# Patient Record
Sex: FEMALE | Race: OTHER | HISPANIC OR LATINO
[De-identification: names, ages, dates, MRNs, and addresses within clinical notes are randomized per-mention and may not be internally consistent; named-entity substitution may affect disease eponyms.]

---

## 2017-01-17 ENCOUNTER — APPOINTMENT (OUTPATIENT)
Dept: INTERNAL MEDICINE | Facility: CLINIC | Age: 47
End: 2017-01-17

## 2017-04-27 ENCOUNTER — APPOINTMENT (OUTPATIENT)
Dept: INTERNAL MEDICINE | Facility: CLINIC | Age: 47
End: 2017-04-27

## 2017-06-06 ENCOUNTER — OUTPATIENT (OUTPATIENT)
Dept: OUTPATIENT SERVICES | Facility: HOSPITAL | Age: 47
LOS: 1 days | End: 2017-06-06
Payer: SELF-PAY

## 2017-06-06 ENCOUNTER — APPOINTMENT (OUTPATIENT)
Dept: INTERNAL MEDICINE | Facility: CLINIC | Age: 47
End: 2017-06-06

## 2017-06-06 VITALS
WEIGHT: 128 LBS | OXYGEN SATURATION: 98 % | DIASTOLIC BLOOD PRESSURE: 79 MMHG | SYSTOLIC BLOOD PRESSURE: 126 MMHG | HEART RATE: 67 BPM | BODY MASS INDEX: 29.75 KG/M2

## 2017-06-06 DIAGNOSIS — Z86.39 PERSONAL HISTORY OF OTHER ENDOCRINE, NUTRITIONAL AND METABOLIC DISEASE: ICD-10-CM

## 2017-06-06 DIAGNOSIS — I10 ESSENTIAL (PRIMARY) HYPERTENSION: ICD-10-CM

## 2017-06-06 DIAGNOSIS — Z90.49 ACQUIRED ABSENCE OF OTHER SPECIFIED PARTS OF DIGESTIVE TRACT: Chronic | ICD-10-CM

## 2017-06-06 PROCEDURE — G0463: CPT

## 2017-10-14 ENCOUNTER — APPOINTMENT (OUTPATIENT)
Dept: MAMMOGRAPHY | Facility: IMAGING CENTER | Age: 47
End: 2017-10-14
Payer: COMMERCIAL

## 2017-10-14 ENCOUNTER — APPOINTMENT (OUTPATIENT)
Dept: OBGYN | Facility: HOSPITAL | Age: 47
End: 2017-10-14

## 2017-10-14 ENCOUNTER — OUTPATIENT (OUTPATIENT)
Dept: OUTPATIENT SERVICES | Facility: HOSPITAL | Age: 47
LOS: 1 days | End: 2017-10-14

## 2017-10-14 ENCOUNTER — OUTPATIENT (OUTPATIENT)
Dept: OUTPATIENT SERVICES | Facility: HOSPITAL | Age: 47
LOS: 1 days | End: 2017-10-14
Payer: COMMERCIAL

## 2017-10-14 DIAGNOSIS — Z00.8 ENCOUNTER FOR OTHER GENERAL EXAMINATION: ICD-10-CM

## 2017-10-14 DIAGNOSIS — Z90.49 ACQUIRED ABSENCE OF OTHER SPECIFIED PARTS OF DIGESTIVE TRACT: Chronic | ICD-10-CM

## 2017-10-14 DIAGNOSIS — Z12.39 ENCOUNTER FOR OTHER SCREENING FOR MALIGNANT NEOPLASM OF BREAST: ICD-10-CM

## 2017-10-14 PROCEDURE — 77067 SCR MAMMO BI INCL CAD: CPT

## 2017-10-14 PROCEDURE — 77063 BREAST TOMOSYNTHESIS BI: CPT

## 2017-10-14 PROCEDURE — 77063 BREAST TOMOSYNTHESIS BI: CPT | Mod: 26

## 2017-10-14 PROCEDURE — G0202: CPT | Mod: 26

## 2018-09-05 ENCOUNTER — LABORATORY RESULT (OUTPATIENT)
Age: 48
End: 2018-09-05

## 2018-09-05 ENCOUNTER — APPOINTMENT (OUTPATIENT)
Dept: INTERNAL MEDICINE | Facility: CLINIC | Age: 48
End: 2018-09-05

## 2018-09-05 ENCOUNTER — OUTPATIENT (OUTPATIENT)
Dept: OUTPATIENT SERVICES | Facility: HOSPITAL | Age: 48
LOS: 1 days | End: 2018-09-05
Payer: SELF-PAY

## 2018-09-05 VITALS
BODY MASS INDEX: 29.39 KG/M2 | OXYGEN SATURATION: 98 % | SYSTOLIC BLOOD PRESSURE: 136 MMHG | HEIGHT: 55 IN | HEART RATE: 69 BPM | DIASTOLIC BLOOD PRESSURE: 80 MMHG | WEIGHT: 127 LBS

## 2018-09-05 DIAGNOSIS — I10 ESSENTIAL (PRIMARY) HYPERTENSION: ICD-10-CM

## 2018-09-05 DIAGNOSIS — Z90.49 ACQUIRED ABSENCE OF OTHER SPECIFIED PARTS OF DIGESTIVE TRACT: Chronic | ICD-10-CM

## 2018-09-05 PROCEDURE — G0463: CPT

## 2018-09-05 PROCEDURE — 84443 ASSAY THYROID STIM HORMONE: CPT

## 2018-09-05 PROCEDURE — 85652 RBC SED RATE AUTOMATED: CPT

## 2018-09-06 LAB
ERYTHROCYTE [SEDIMENTATION RATE] IN BLOOD: 3 MM/HR — SIGNIFICANT CHANGE UP (ref 0–15)
T4 FREE+ TSH PNL SERPL: 2.15 UIU/ML — SIGNIFICANT CHANGE UP (ref 0.27–4.2)

## 2018-09-07 NOTE — ASSESSMENT
[FreeTextEntry1] : 48 yo Irish speaking F w/ hx of vertigo, possible HTN (as per pt), presenting for check up, also w/ the following concerns\par \par # R arm paresthesia \par - comes and goes, sometimes a full day, then not again for a week, mostly affects her at night, interfering with sleep, has tried creams and massages without relief, also reports some weakness, started along shoulder region, affects all 5 fingers, denies trauma, on questioning, reports blurry vision, primarily at night, some burning of her eyes, denies loss of central vision. some difficulty getting up from chair on occasion. \par -----> on exam, BP equal in both arms, some pain on on turning head to L, no masses on neck or spinal TTP\par -----> ddx includes cervical radiculopathy, lower threshold for an inflammatory etiology, \par -----> checking TSH, ESR, refer to PT \par \par # Allergic rhinitis \par - represcribe fluticasone \par \par # HCM \par - deferred for now. \par - assess at next visit\par \par DW Dr. Sanchez\par Advised pt to call back clinic if sx worsen or do not approve. otherwise RTC 5 weeks for reassessment

## 2018-09-07 NOTE — PHYSICAL EXAM

## 2018-09-07 NOTE — HISTORY OF PRESENT ILLNESS
[FreeTextEntry1] : comprehensive converted to acute given presentation [de-identified] : 48 yo Hebrew speaking F w/ hx of vertigo, possible HTN (as per pt), presenting for check up, also w/ the following concerns\par \par # R arm paresthesia \par - comes and goes, sometimes a full day, then not again for a week, mostly affects her at night, interfering with sleep\par - has tried creams and massages without relief. \par - also reports some weakness\par - started along shoulder region, affects all 5 fingers, \par - denies trauma. \par - on questioning, reports blurry vision, primarily at night, some burning of her eyes, denies loss of central vision. \par \par # allergies \par - needs renewal for a nasal spray

## 2018-09-07 NOTE — REVIEW OF SYSTEMS
[Headache] : headache [Negative] : Heme/Lymph [Fever] : no fever [Chills] : no chills [Earache] : no earache [Hearing Loss] : no hearing loss [Chest Pain] : no chest pain [Palpitations] : no palpitations [Shortness Of Breath] : no shortness of breath [Wheezing] : no wheezing [Abdominal Pain] : no abdominal pain [Nausea] : no nausea [Joint Pain] : no joint pain [de-identified] : reports a sinus headache, chronic issue. R arm numbess. see hpi for more details.

## 2018-09-14 DIAGNOSIS — M54.10 RADICULOPATHY, SITE UNSPECIFIED: ICD-10-CM

## 2018-09-14 DIAGNOSIS — H81.12 BENIGN PAROXYSMAL VERTIGO, LEFT EAR: ICD-10-CM

## 2018-11-14 ENCOUNTER — APPOINTMENT (OUTPATIENT)
Dept: INTERNAL MEDICINE | Facility: CLINIC | Age: 48
End: 2018-11-14

## 2018-11-14 ENCOUNTER — LABORATORY RESULT (OUTPATIENT)
Age: 48
End: 2018-11-14

## 2018-11-14 ENCOUNTER — OUTPATIENT (OUTPATIENT)
Dept: OUTPATIENT SERVICES | Facility: HOSPITAL | Age: 48
LOS: 1 days | End: 2018-11-14
Payer: SELF-PAY

## 2018-11-14 VITALS
HEART RATE: 74 BPM | WEIGHT: 124 LBS | SYSTOLIC BLOOD PRESSURE: 130 MMHG | OXYGEN SATURATION: 97 % | DIASTOLIC BLOOD PRESSURE: 79 MMHG | BODY MASS INDEX: 28.82 KG/M2

## 2018-11-14 DIAGNOSIS — Z00.00 ENCOUNTER FOR GENERAL ADULT MEDICAL EXAMINATION WITHOUT ABNORMAL FINDINGS: ICD-10-CM

## 2018-11-14 DIAGNOSIS — I10 ESSENTIAL (PRIMARY) HYPERTENSION: ICD-10-CM

## 2018-11-14 DIAGNOSIS — H81.12 BENIGN PAROXYSMAL VERTIGO, LEFT EAR: ICD-10-CM

## 2018-11-14 DIAGNOSIS — Z90.49 ACQUIRED ABSENCE OF OTHER SPECIFIED PARTS OF DIGESTIVE TRACT: Chronic | ICD-10-CM

## 2018-11-14 DIAGNOSIS — M54.10 RADICULOPATHY, SITE UNSPECIFIED: ICD-10-CM

## 2018-11-14 PROCEDURE — 90656 IIV3 VACC NO PRSV 0.5 ML IM: CPT

## 2018-11-14 PROCEDURE — 83036 HEMOGLOBIN GLYCOSYLATED A1C: CPT

## 2018-11-14 PROCEDURE — 80048 BASIC METABOLIC PNL TOTAL CA: CPT

## 2018-11-14 PROCEDURE — G0008: CPT

## 2018-11-14 PROCEDURE — G0463: CPT

## 2018-11-15 LAB
ANION GAP SERPL CALC-SCNC: 11 MMOL/L — SIGNIFICANT CHANGE UP (ref 5–17)
BUN SERPL-MCNC: 17 MG/DL — SIGNIFICANT CHANGE UP (ref 7–23)
CALCIUM SERPL-MCNC: 9.9 MG/DL — SIGNIFICANT CHANGE UP (ref 8.4–10.5)
CHLORIDE SERPL-SCNC: 105 MMOL/L — SIGNIFICANT CHANGE UP (ref 96–108)
CO2 SERPL-SCNC: 27 MMOL/L — SIGNIFICANT CHANGE UP (ref 22–31)
CREAT SERPL-MCNC: 0.6 MG/DL — SIGNIFICANT CHANGE UP (ref 0.5–1.3)
GLUCOSE SERPL-MCNC: 93 MG/DL — SIGNIFICANT CHANGE UP (ref 70–99)
HBA1C BLD-MCNC: 5.8 % — HIGH (ref 4–5.6)
POTASSIUM SERPL-MCNC: 4.3 MMOL/L — SIGNIFICANT CHANGE UP (ref 3.5–5.3)
POTASSIUM SERPL-SCNC: 4.3 MMOL/L — SIGNIFICANT CHANGE UP (ref 3.5–5.3)
SODIUM SERPL-SCNC: 143 MMOL/L — SIGNIFICANT CHANGE UP (ref 135–145)

## 2018-11-18 NOTE — HISTORY OF PRESENT ILLNESS
[FreeTextEntry1] : follow  [de-identified] : 48 yo Kyrgyz speaking F w/ hx of vertigo, possible HTN (as per pt), presenting for check up. \par \par # R arm paresthesias: \par - TSH and ESR sent at her last visit and resulted as normal\par - has been going to PT, reports her numbness is basically resolved, when it is present, which is much less frequent/severe, she will take tylenol or motrin which takes away her symptoms. \par \par # low back/hip pain: Started 2 weeks ago, no trauma, localizes to her L gluteal region, worsened with stretching, some pain even when not stretching, but is better this week than last, has tried creams which do not help, tylenol and motrin resolve the pain. \par \par

## 2018-11-18 NOTE — PLAN
[FreeTextEntry1] : 46 yo [place] speaking F w/ hx of vertigo, possible HTN (as per pt), presenting for check up. \par \par # R arm paresthesias: \par - TSH and ESR sent at her last visit and resulted as normal\par - has been going to PT, reports her numbness is basically resolved, when it is present, which is much less frequent/severe, she will take tylenol or motrin which takes away her symptoms. \par ----> sx appear better controlled, exam today wnl, likely had a muscle strain, cont w/ PT\par \par # low back/hip pain: Started 2 weeks ago, no trauma, localizes to her L gluteal region, worsened with stretching, some pain even when not stretching, but is better this week than last, has tried creams which do not help, tylenol and motrin resolve the pain. \par ---> pain had some tenderness in gluteal region on exam, SLE BL wnl, pain improving over past week, has not tried any gluteal specific PT exercises, will work with PT on improving her flexibility in hip region\par \par # HCM\par - flu shot and a1c check today\par - cerv and breast cancer screening UTD\par \par DW Dr. Murphy\par RTC 6 months - 12 months\par

## 2019-01-23 ENCOUNTER — APPOINTMENT (OUTPATIENT)
Dept: INTERNAL MEDICINE | Facility: CLINIC | Age: 49
End: 2019-01-23

## 2019-01-29 ENCOUNTER — LABORATORY RESULT (OUTPATIENT)
Age: 49
End: 2019-01-29

## 2019-01-30 ENCOUNTER — OUTPATIENT (OUTPATIENT)
Dept: OUTPATIENT SERVICES | Facility: HOSPITAL | Age: 49
LOS: 1 days | End: 2019-01-30
Payer: SELF-PAY

## 2019-01-30 ENCOUNTER — APPOINTMENT (OUTPATIENT)
Dept: INTERNAL MEDICINE | Facility: CLINIC | Age: 49
End: 2019-01-30

## 2019-01-30 VITALS
BODY MASS INDEX: 27.77 KG/M2 | HEIGHT: 55 IN | SYSTOLIC BLOOD PRESSURE: 154 MMHG | DIASTOLIC BLOOD PRESSURE: 90 MMHG | WEIGHT: 120 LBS

## 2019-01-30 DIAGNOSIS — Z90.49 ACQUIRED ABSENCE OF OTHER SPECIFIED PARTS OF DIGESTIVE TRACT: Chronic | ICD-10-CM

## 2019-01-30 DIAGNOSIS — I10 ESSENTIAL (PRIMARY) HYPERTENSION: ICD-10-CM

## 2019-01-30 DIAGNOSIS — G57.92 UNSPECIFIED MONONEUROPATHY OF LEFT LOWER LIMB: ICD-10-CM

## 2019-01-30 PROCEDURE — 80053 COMPREHEN METABOLIC PANEL: CPT

## 2019-01-30 PROCEDURE — 36415 COLL VENOUS BLD VENIPUNCTURE: CPT

## 2019-01-30 PROCEDURE — G0463: CPT

## 2019-01-30 PROCEDURE — 86780 TREPONEMA PALLIDUM: CPT

## 2019-01-30 PROCEDURE — 82607 VITAMIN B-12: CPT

## 2019-01-30 PROCEDURE — 84443 ASSAY THYROID STIM HORMONE: CPT

## 2019-01-30 PROCEDURE — 83036 HEMOGLOBIN GLYCOSYLATED A1C: CPT

## 2019-01-30 PROCEDURE — 87389 HIV-1 AG W/HIV-1&-2 AB AG IA: CPT

## 2019-01-31 LAB
ALBUMIN SERPL ELPH-MCNC: 4.5 G/DL — SIGNIFICANT CHANGE UP (ref 3.3–5)
ALP SERPL-CCNC: 146 U/L — HIGH (ref 40–120)
ALT FLD-CCNC: 21 U/L — SIGNIFICANT CHANGE UP (ref 10–45)
ANION GAP SERPL CALC-SCNC: 9 MMOL/L — SIGNIFICANT CHANGE UP (ref 5–17)
AST SERPL-CCNC: 22 U/L — SIGNIFICANT CHANGE UP (ref 10–40)
BILIRUB SERPL-MCNC: <0.2 MG/DL — SIGNIFICANT CHANGE UP (ref 0.2–1.2)
BUN SERPL-MCNC: 14 MG/DL — SIGNIFICANT CHANGE UP (ref 7–23)
CALCIUM SERPL-MCNC: 9.4 MG/DL — SIGNIFICANT CHANGE UP (ref 8.4–10.5)
CHLORIDE SERPL-SCNC: 104 MMOL/L — SIGNIFICANT CHANGE UP (ref 96–108)
CO2 SERPL-SCNC: 28 MMOL/L — SIGNIFICANT CHANGE UP (ref 22–31)
CREAT SERPL-MCNC: 0.59 MG/DL — SIGNIFICANT CHANGE UP (ref 0.5–1.3)
GLUCOSE SERPL-MCNC: 89 MG/DL — SIGNIFICANT CHANGE UP (ref 70–99)
HBA1C BLD-MCNC: 5.6 % — SIGNIFICANT CHANGE UP (ref 4–5.6)
HIV 1+2 AB+HIV1 P24 AG SERPL QL IA: SIGNIFICANT CHANGE UP
POTASSIUM SERPL-MCNC: 4.2 MMOL/L — SIGNIFICANT CHANGE UP (ref 3.5–5.3)
POTASSIUM SERPL-SCNC: 4.2 MMOL/L — SIGNIFICANT CHANGE UP (ref 3.5–5.3)
PROT SERPL-MCNC: 7.1 G/DL — SIGNIFICANT CHANGE UP (ref 6–8.3)
SODIUM SERPL-SCNC: 141 MMOL/L — SIGNIFICANT CHANGE UP (ref 135–145)
T PALLIDUM AB TITR SER: NEGATIVE — SIGNIFICANT CHANGE UP
T4 FREE+ TSH PNL SERPL: 1.89 UIU/ML — SIGNIFICANT CHANGE UP (ref 0.27–4.2)
VIT B12 SERPL-MCNC: 706 PG/ML — SIGNIFICANT CHANGE UP (ref 232–1245)

## 2019-01-31 NOTE — REVIEW OF SYSTEMS
[Joint Pain] : joint pain [Negative] : Psychiatric [FreeTextEntry3] : tearing with cold weather [FreeTextEntry9] : left hip pain

## 2019-01-31 NOTE — PHYSICAL EXAM
[Well Nourished] : well nourished [Well Developed] : well developed [Well-Appearing] : well-appearing [PERRL] : pupils equal round and reactive to light [EOMI] : extraocular movements intact [Normal Outer Ear/Nose] : the outer ears and nose were normal in appearance [Normal Oropharynx] : the oropharynx was normal [Supple] : supple [No Lymphadenopathy] : no lymphadenopathy [No Respiratory Distress] : no respiratory distress  [Clear to Auscultation] : lungs were clear to auscultation bilaterally [Normal Rate] : normal rate  [Regular Rhythm] : with a regular rhythm [No Carotid Bruits] : no carotid bruits [No Abdominal Bruit] : a ~M bruit was not heard ~T in the abdomen [No Varicosities] : no varicosities [No Edema] : there was no peripheral edema [No Extremity Clubbing/Cyanosis] : no extremity clubbing/cyanosis [No Palpable Aorta] : no palpable aorta [Soft] : abdomen soft [Non Tender] : non-tender [No CVA Tenderness] : no CVA  tenderness [No Spinal Tenderness] : no spinal tenderness [No Joint Swelling] : no joint swelling [Grossly Normal Strength/Tone] : grossly normal strength/tone [No Rash] : no rash [Normal Gait] : normal gait [Coordination Grossly Intact] : coordination grossly intact [Normal Affect] : the affect was normal [de-identified] : right eye medial erythema and pterygium [de-identified] : no tenderness to palpation in left hip, slight exacerbation of left hip pain with posterior leg motion; no palpable mass or cyst in left popliteal fossa

## 2019-01-31 NOTE — HEALTH RISK ASSESSMENT
[1] : 1) Little interest or pleasure doing things for several days (1) [0] : 2) Feeling down, depressed, or hopeless: Not at all (0) [] : No

## 2019-01-31 NOTE — ASSESSMENT
[FreeTextEntry1] : 47 yo Chinese speaking F w/ hx of vertigo, possible HTN (as per pt), pre-DM presenting for blood sugar check, as well as tingling in her left leg and pain in the left hip. \par \par # L hip pain \par - Likely secondary to bursitis/tendonitis, without point tenderness to palpation on exam\par - Patient advised to rest the left leg and use ice or heating pack as needed\par - Pt advised to use OTC Naproxen BID for 2 weeks to decrease inflammation, and call the clinic if the pain has not diminished.\par \par # L leg tingling \par - Likely neuropathic, as the tingling is not caused or exacerbated by leg positioning and is not related to the left hip pain, and patient is pre-diabetic.\par - Ordered A1C, B12, CMP, TSH, RPR (syphilis screen), and HIV\par - if labs return normal, patient should follow up with neurology for further testing of neuropathy\par - Gave neurology referral now, so that patient can make appointment and will call her with lab results\par \par # HCM\par - mammogram ordered \par - ophthalmology referral provided

## 2019-01-31 NOTE — HISTORY OF PRESENT ILLNESS
[FreeTextEntry1] : diabetes check, left leg tingling and left hip pain [de-identified] : 49 yo Czech speaking F w/ hx of vertigo, possible HTN (as per pt), pre-DM presenting for blood sugar check, as well as tingling in her left leg and pain in the left hip. \par \par The left hip pain has affected her for 1 month every day and is getting worse. She formerly used Tylenol and icy hot which helped but now they do not help. The pain bothers her at night when she moves in bed and is worse when she lies down. When she is moving and working as a  she forgets about the pain but it bothers her more at night. Patient denies any trauma or recent falls. This is the first time this pain has happened to her. She appears to shift uncomfortably in her seat from time to time during the interview.\par \par The tingling in her left leg is unrelated to the left hip pain. She says the tingling happens with sudden onset and no known triggers for 1-2 hours per day every day for the past 1 month. She feels that the back of her left knee is inflamed which is causing the tingling. She says that she does not usually cross her legs when sitting. \par \par Patient also requests referral for eye check with ophthalmology because her right eye is erythematous on the nasal side with pterygium on exam, and she feels that her vision is worse at night.

## 2019-02-15 ENCOUNTER — FORM ENCOUNTER (OUTPATIENT)
Age: 49
End: 2019-02-15

## 2019-02-16 ENCOUNTER — OUTPATIENT (OUTPATIENT)
Dept: OUTPATIENT SERVICES | Facility: HOSPITAL | Age: 49
LOS: 1 days | End: 2019-02-16
Payer: SELF-PAY

## 2019-02-16 ENCOUNTER — APPOINTMENT (OUTPATIENT)
Dept: MAMMOGRAPHY | Facility: IMAGING CENTER | Age: 49
End: 2019-02-16
Payer: COMMERCIAL

## 2019-02-16 DIAGNOSIS — Z00.00 ENCOUNTER FOR GENERAL ADULT MEDICAL EXAMINATION WITHOUT ABNORMAL FINDINGS: ICD-10-CM

## 2019-02-16 DIAGNOSIS — Z90.49 ACQUIRED ABSENCE OF OTHER SPECIFIED PARTS OF DIGESTIVE TRACT: Chronic | ICD-10-CM

## 2019-02-16 PROCEDURE — 77067 SCR MAMMO BI INCL CAD: CPT | Mod: 26

## 2019-02-16 PROCEDURE — 77063 BREAST TOMOSYNTHESIS BI: CPT

## 2019-02-16 PROCEDURE — 77067 SCR MAMMO BI INCL CAD: CPT

## 2019-02-16 PROCEDURE — 77063 BREAST TOMOSYNTHESIS BI: CPT | Mod: 26

## 2019-03-28 ENCOUNTER — APPOINTMENT (OUTPATIENT)
Dept: OPHTHALMOLOGY | Facility: CLINIC | Age: 49
End: 2019-03-28

## 2019-04-28 ENCOUNTER — LABORATORY RESULT (OUTPATIENT)
Age: 49
End: 2019-04-28

## 2019-04-29 ENCOUNTER — APPOINTMENT (OUTPATIENT)
Dept: INTERNAL MEDICINE | Facility: CLINIC | Age: 49
End: 2019-04-29

## 2019-04-29 ENCOUNTER — OUTPATIENT (OUTPATIENT)
Dept: OUTPATIENT SERVICES | Facility: HOSPITAL | Age: 49
LOS: 1 days | End: 2019-04-29
Payer: SELF-PAY

## 2019-04-29 VITALS
WEIGHT: 120 LBS | SYSTOLIC BLOOD PRESSURE: 130 MMHG | BODY MASS INDEX: 27.89 KG/M2 | OXYGEN SATURATION: 99 % | HEART RATE: 67 BPM | DIASTOLIC BLOOD PRESSURE: 88 MMHG

## 2019-04-29 DIAGNOSIS — I10 ESSENTIAL (PRIMARY) HYPERTENSION: ICD-10-CM

## 2019-04-29 DIAGNOSIS — Z90.49 ACQUIRED ABSENCE OF OTHER SPECIFIED PARTS OF DIGESTIVE TRACT: Chronic | ICD-10-CM

## 2019-04-29 PROCEDURE — 82977 ASSAY OF GGT: CPT

## 2019-04-29 PROCEDURE — 85027 COMPLETE CBC AUTOMATED: CPT

## 2019-04-29 PROCEDURE — 36415 COLL VENOUS BLD VENIPUNCTURE: CPT

## 2019-04-29 PROCEDURE — G0463: CPT

## 2019-04-29 PROCEDURE — 80053 COMPREHEN METABOLIC PANEL: CPT

## 2019-04-29 RX ORDER — CETIRIZINE HYDROCHLORIDE 10 MG/1
10 TABLET, COATED ORAL
Qty: 90 | Refills: 1 | Status: DISCONTINUED | COMMUNITY
Start: 2017-06-06 | End: 2019-04-29

## 2019-04-30 LAB
ALBUMIN SERPL ELPH-MCNC: 4.3 G/DL — SIGNIFICANT CHANGE UP (ref 3.3–5)
ALP SERPL-CCNC: 182 U/L — HIGH (ref 40–120)
ALT FLD-CCNC: 45 U/L — SIGNIFICANT CHANGE UP (ref 10–45)
ANION GAP SERPL CALC-SCNC: 12 MMOL/L — SIGNIFICANT CHANGE UP (ref 5–17)
AST SERPL-CCNC: 29 U/L — SIGNIFICANT CHANGE UP (ref 10–40)
BILIRUB SERPL-MCNC: <0.2 MG/DL — SIGNIFICANT CHANGE UP (ref 0.2–1.2)
BUN SERPL-MCNC: 15 MG/DL — SIGNIFICANT CHANGE UP (ref 7–23)
CALCIUM SERPL-MCNC: 9.5 MG/DL — SIGNIFICANT CHANGE UP (ref 8.4–10.5)
CHLORIDE SERPL-SCNC: 105 MMOL/L — SIGNIFICANT CHANGE UP (ref 96–108)
CO2 SERPL-SCNC: 27 MMOL/L — SIGNIFICANT CHANGE UP (ref 22–31)
CREAT SERPL-MCNC: 0.87 MG/DL — SIGNIFICANT CHANGE UP (ref 0.5–1.3)
GGT SERPL-CCNC: 75 U/L — HIGH (ref 8–40)
GLUCOSE SERPL-MCNC: 107 MG/DL — HIGH (ref 70–99)
HCT VFR BLD CALC: 39.9 % — SIGNIFICANT CHANGE UP (ref 34.5–45)
HGB BLD-MCNC: 12.6 G/DL — SIGNIFICANT CHANGE UP (ref 11.5–15.5)
MCHC RBC-ENTMCNC: 29.9 PG — SIGNIFICANT CHANGE UP (ref 27–34)
MCHC RBC-ENTMCNC: 31.6 GM/DL — LOW (ref 32–36)
MCV RBC AUTO: 94.5 FL — SIGNIFICANT CHANGE UP (ref 80–100)
PLATELET # BLD AUTO: 338 K/UL — SIGNIFICANT CHANGE UP (ref 150–400)
POTASSIUM SERPL-MCNC: 4.2 MMOL/L — SIGNIFICANT CHANGE UP (ref 3.5–5.3)
POTASSIUM SERPL-SCNC: 4.2 MMOL/L — SIGNIFICANT CHANGE UP (ref 3.5–5.3)
PROT SERPL-MCNC: 6.9 G/DL — SIGNIFICANT CHANGE UP (ref 6–8.3)
RBC # BLD: 4.22 M/UL — SIGNIFICANT CHANGE UP (ref 3.8–5.2)
RBC # FLD: 12.3 % — SIGNIFICANT CHANGE UP (ref 10.3–14.5)
SODIUM SERPL-SCNC: 144 MMOL/L — SIGNIFICANT CHANGE UP (ref 135–145)
WBC # BLD: 7.47 K/UL — SIGNIFICANT CHANGE UP (ref 3.8–10.5)
WBC # FLD AUTO: 7.47 K/UL — SIGNIFICANT CHANGE UP (ref 3.8–10.5)

## 2019-04-30 NOTE — PHYSICAL EXAM
[No Acute Distress] : no acute distress [Well Nourished] : well nourished [Well Developed] : well developed [Well-Appearing] : well-appearing [Normal Sclera/Conjunctiva] : normal sclera/conjunctiva [PERRL] : pupils equal round and reactive to light [EOMI] : extraocular movements intact [Normal Outer Ear/Nose] : the outer ears and nose were normal in appearance [Normal Oropharynx] : the oropharynx was normal [No JVD] : no jugular venous distention [Supple] : supple [No Lymphadenopathy] : no lymphadenopathy [No Respiratory Distress] : no respiratory distress  [Clear to Auscultation] : lungs were clear to auscultation bilaterally [No Accessory Muscle Use] : no accessory muscle use [Normal Rate] : normal rate  [Regular Rhythm] : with a regular rhythm [Normal S1, S2] : normal S1 and S2 [No Murmur] : no murmur heard [Pedal Pulses Present] : the pedal pulses are present [No Edema] : there was no peripheral edema [Soft] : abdomen soft [Non Tender] : non-tender [Non-distended] : non-distended [No Masses] : no abdominal mass palpated [No HSM] : no HSM [Normal Bowel Sounds] : normal bowel sounds [Normal Supraclavicular Nodes] : no supraclavicular lymphadenopathy [Normal Posterior Cervical Nodes] : no posterior cervical lymphadenopathy [Normal Anterior Cervical Nodes] : no anterior cervical lymphadenopathy [No CVA Tenderness] : no CVA  tenderness [No Spinal Tenderness] : no spinal tenderness [No Joint Swelling] : no joint swelling [Grossly Normal Strength/Tone] : grossly normal strength/tone [No Rash] : no rash [Normal Gait] : normal gait [Coordination Grossly Intact] : coordination grossly intact [No Focal Deficits] : no focal deficits [Deep Tendon Reflexes (DTR)] : deep tendon reflexes were 2+ and symmetric [Normal Affect] : the affect was normal [Normal Insight/Judgement] : insight and judgment were intact

## 2019-05-03 DIAGNOSIS — R74.8 ABNORMAL LEVELS OF OTHER SERUM ENZYMES: ICD-10-CM

## 2019-05-03 DIAGNOSIS — G57.92 UNSPECIFIED MONONEUROPATHY OF LEFT LOWER LIMB: ICD-10-CM

## 2019-05-03 NOTE — END OF VISIT
[] : Resident [FreeTextEntry3] : 48F with h/o HTN here for f/u after found to have elevated alk phos, will repeat cmp, cbc, ggt and pending results will likely send for RUQ US. Agree with remainder of plan as documented by Dr. Castro.

## 2019-05-03 NOTE — REVIEW OF SYSTEMS
[Fever] : no fever [Chills] : no chills [Fatigue] : no fatigue [Night Sweats] : no night sweats [Pain] : no pain [Vision Problems] : no vision problems [Chest Pain] : no chest pain [Palpitations] : no palpitations [Lower Ext Edema] : no lower extremity edema [Wheezing] : no wheezing [Shortness Of Breath] : no shortness of breath [Cough] : no cough [Dyspnea on Exertion] : no dyspnea on exertion [Abdominal Pain] : no abdominal pain [Nausea] : no nausea [Constipation] : no constipation [Diarrhea] : diarrhea [Melena] : no melena [Vomiting] : no vomiting [Dysuria] : no dysuria [Incontinence] : no incontinence [Hematuria] : no hematuria [Muscle Pain] : no muscle pain [Joint Pain] : no joint pain [Back Pain] : no back pain [Skin Rash] : no skin rash [Headache] : no headache

## 2019-05-03 NOTE — HISTORY OF PRESENT ILLNESS
[FreeTextEntry1] : abnormal labs [de-identified] : 49 yo Sierra Leonean speaking F w/ hx of vertigo, HTN, pre-DM presents due to abnormal labs. \par \par elevated alk phos  - Pt with CMP in jan due to c/o fatigue, weight loss, and shoulder and hip pain. Only notable for alk phos of 146. Was advised to come back for f/u. Today she reports resolution of shoulder and hip pain with aleve. Denies h/a, fever, chills, night sweats, n/v/d, abd pain, extremity pain. No further weight loss but endorses 10 lbs over 1 year unintentionally. Denies skin changes or RUQ pain as well. Works in [employer].

## 2019-05-03 NOTE — ASSESSMENT
[FreeTextEntry1] : 49 yo Uzbek speaking F w/ hx of vertigo, possible HTN (as per pt), pre-DM presents due to elevated alk phos. \par \par Elevated alk Phos - non toxic appearing, with unremarkable abdominal exam. Only concerning hx is unintentional weight loss of apprx 10 lbs over past year, although stable for past 3 months at 120 lbs.\par -  ordered cbc, cmp, ggt\par - pending result, consider RUQ u/s vs bone scan\par \par Neuropathic pain \par - improved with aleve qd\par \par HTN - controlled off meds\par - cont to monitor\par \par Prediabetic - improved A1c to 5.6 in Jan\par - con to monitor \par \par f/u PRN pending lab results\par d/w Dr Macias

## 2019-05-15 ENCOUNTER — APPOINTMENT (OUTPATIENT)
Dept: ULTRASOUND IMAGING | Facility: CLINIC | Age: 49
End: 2019-05-15
Payer: COMMERCIAL

## 2019-05-15 ENCOUNTER — OUTPATIENT (OUTPATIENT)
Dept: OUTPATIENT SERVICES | Facility: HOSPITAL | Age: 49
LOS: 1 days | End: 2019-05-15

## 2019-05-15 DIAGNOSIS — I10 ESSENTIAL (PRIMARY) HYPERTENSION: ICD-10-CM

## 2019-05-15 DIAGNOSIS — R74.8 ABNORMAL LEVELS OF OTHER SERUM ENZYMES: ICD-10-CM

## 2019-05-15 DIAGNOSIS — Z90.49 ACQUIRED ABSENCE OF OTHER SPECIFIED PARTS OF DIGESTIVE TRACT: Chronic | ICD-10-CM

## 2019-05-15 DIAGNOSIS — G57.92 UNSPECIFIED MONONEUROPATHY OF LEFT LOWER LIMB: ICD-10-CM

## 2019-05-15 PROCEDURE — 76705 ECHO EXAM OF ABDOMEN: CPT | Mod: 26

## 2019-07-25 ENCOUNTER — APPOINTMENT (OUTPATIENT)
Dept: OPHTHALMOLOGY | Facility: CLINIC | Age: 49
End: 2019-07-25

## 2019-10-23 ENCOUNTER — OUTPATIENT (OUTPATIENT)
Dept: OUTPATIENT SERVICES | Facility: HOSPITAL | Age: 49
LOS: 1 days | End: 2019-10-23
Payer: SELF-PAY

## 2019-10-23 ENCOUNTER — APPOINTMENT (OUTPATIENT)
Dept: INTERNAL MEDICINE | Facility: CLINIC | Age: 49
End: 2019-10-23

## 2019-10-23 VITALS
BODY MASS INDEX: 28.82 KG/M2 | DIASTOLIC BLOOD PRESSURE: 86 MMHG | WEIGHT: 124 LBS | OXYGEN SATURATION: 99 % | SYSTOLIC BLOOD PRESSURE: 148 MMHG | HEART RATE: 78 BPM

## 2019-10-23 VITALS — DIASTOLIC BLOOD PRESSURE: 86 MMHG | SYSTOLIC BLOOD PRESSURE: 148 MMHG

## 2019-10-23 DIAGNOSIS — Z90.49 ACQUIRED ABSENCE OF OTHER SPECIFIED PARTS OF DIGESTIVE TRACT: Chronic | ICD-10-CM

## 2019-10-23 DIAGNOSIS — I10 ESSENTIAL (PRIMARY) HYPERTENSION: ICD-10-CM

## 2019-10-23 DIAGNOSIS — K75.81 NONALCOHOLIC STEATOHEPATITIS (NASH): ICD-10-CM

## 2019-10-23 PROCEDURE — G0463: CPT

## 2019-10-23 PROCEDURE — 90688 IIV4 VACCINE SPLT 0.5 ML IM: CPT

## 2019-10-23 PROCEDURE — G0008: CPT

## 2019-10-23 RX ORDER — NAPROXEN SODIUM 220 MG
220 TABLET ORAL TWICE DAILY
Qty: 60 | Refills: 0 | Status: DISCONTINUED | COMMUNITY
Start: 2019-04-30 | End: 2019-10-23

## 2019-10-23 NOTE — PHYSICAL EXAM
[No Acute Distress] : no acute distress [PERRL] : pupils equal round and reactive to light [Normal Outer Ear/Nose] : the outer ears and nose were normal in appearance [No Lymphadenopathy] : no lymphadenopathy [No Respiratory Distress] : no respiratory distress  [Clear to Auscultation] : lungs were clear to auscultation bilaterally [Normal Rate] : normal rate  [Regular Rhythm] : with a regular rhythm [No Edema] : there was no peripheral edema [Soft] : abdomen soft [No HSM] : no HSM [Non Tender] : non-tender [Coordination Grossly Intact] : coordination grossly intact [Normal Affect] : the affect was normal [Normal Insight/Judgement] : insight and judgment were intact

## 2019-10-23 NOTE — COUNSELING
[Benefits of weight loss discussed] : Benefits of weight loss discussed [Potential consequences of obesity discussed] : Potential consequences of obesity discussed [Encouraged to increase physical activity] : Encouraged to increase physical activity [Target Wt Loss Goal ___] : Weight Loss Goals: Target weight loss goal [unfilled] lbs [Decrease Portions] : decrease portions [Good understanding] : Patient has a good understanding of disease, goals and obesity follow-up plan

## 2019-11-14 NOTE — REVIEW OF SYSTEMS
[Pain] : pain [Nasal Discharge] : nasal discharge [Skin Rash] : skin rash [Fever] : no fever [Night Sweats] : no night sweats [Vision Problems] : no vision problems [Recent Change In Weight] : ~T no recent weight change [Hearing Loss] : no hearing loss [Chest Pain] : no chest pain [Abdominal Pain] : no abdominal pain [Shortness Of Breath] : no shortness of breath [Palpitations] : no palpitations [Constipation] : no constipation [Diarrhea] : diarrhea [Joint Pain] : no joint pain [Dysuria] : no dysuria [Vaginal Discharge] : no vaginal discharge [Vomiting] : no vomiting [Headache] : no headache [Muscle Pain] : no muscle pain [Dizziness] : no dizziness [Fainting] : no fainting [Depression] : no depression [de-identified] : "feels good lately, thanks to god"

## 2019-11-14 NOTE — ASSESSMENT
[FreeTextEntry1] : 1. HCM - pap smear 2 years ago, last mammogram 1 year ago, will give flu shot. Repeat lipid panel.\par \par 2. Elevated alk phos, possible hepatic steatosis- educated patient about risks and lifestyle modifications. recommended she lose 5-10 lb which would be 5-10% of her body weight. Abstain from alcohol, which she does already. Encouraged replacing tortillas/rice with more vegetables, walking 10,000 steps/5 miles a day.\par \par Will repeat CMP and check antimitochondrial antibody to evaluate for PBC. \par \par 3. HTN- BP elevated here to 148/86. Has been off medications since last year. Will recheck in 5 weeks and restart lisinopril if still elevated. \par \par 4. Pre-DM - will check HbA1c.

## 2019-11-14 NOTE — HISTORY OF PRESENT ILLNESS
[FreeTextEntry1] : Follow up for elevated GGT, Alk phos, RUQ u/s results  [de-identified] : Pt is a 50 yo woman with history of pre-DM, HTN, vertigo, allergies, neuropathic pain, here for follow up of her results from the RUQ ultrasound which showed mild hepatic steatosis.\par \par She reports she is no longer taking any medications except fluticasone nasal spray. She was taking Aleve for arm and hip pain 2 months ago, but stopped. Her pain has improved greatly. \par \par She reports that she lost weight unintentionally last year, but has gained weight since that time. She had an URI infection last week with rhinorrhea, cough, no fevers. She denies vomiting, diarrhea, chest pain, palpitations. \par \par Diet: Breakfast- eggs, tortillas, beans, juice. Lunch-skips lunch, eats yogurt and fruit. Dinner- varies, eats chicken or fish, salad, rice, tortilla. She works as a  and takes care of her grandchildren on the weekends. She walks to work and back, about 30 min per day. \par \par Requests flu shot today. \par \par

## 2020-01-02 ENCOUNTER — OUTPATIENT (OUTPATIENT)
Dept: OUTPATIENT SERVICES | Facility: HOSPITAL | Age: 50
LOS: 1 days | End: 2020-01-02
Payer: SELF-PAY

## 2020-01-02 ENCOUNTER — APPOINTMENT (OUTPATIENT)
Dept: INTERNAL MEDICINE | Facility: CLINIC | Age: 50
End: 2020-01-02

## 2020-01-02 VITALS
SYSTOLIC BLOOD PRESSURE: 150 MMHG | BODY MASS INDEX: 29.75 KG/M2 | DIASTOLIC BLOOD PRESSURE: 90 MMHG | OXYGEN SATURATION: 99 % | WEIGHT: 128 LBS | HEART RATE: 77 BPM

## 2020-01-02 DIAGNOSIS — Z90.49 ACQUIRED ABSENCE OF OTHER SPECIFIED PARTS OF DIGESTIVE TRACT: Chronic | ICD-10-CM

## 2020-01-02 DIAGNOSIS — I10 ESSENTIAL (PRIMARY) HYPERTENSION: ICD-10-CM

## 2020-01-02 PROCEDURE — G0463: CPT

## 2020-01-03 NOTE — PHYSICAL EXAM
[No Acute Distress] : no acute distress [Well Nourished] : well nourished [Well Developed] : well developed [Well-Appearing] : well-appearing [No Respiratory Distress] : no respiratory distress  [No Accessory Muscle Use] : no accessory muscle use [Clear to Auscultation] : lungs were clear to auscultation bilaterally [Normal Rate] : normal rate  [Regular Rhythm] : with a regular rhythm [Normal S1, S2] : normal S1 and S2 [No Murmur] : no murmur heard [Pedal Pulses Present] : the pedal pulses are present [No Edema] : there was no peripheral edema [Soft] : abdomen soft [Non Tender] : non-tender [Non-distended] : non-distended [No Masses] : no abdominal mass palpated [No HSM] : no HSM [Normal Bowel Sounds] : normal bowel sounds [No CVA Tenderness] : no CVA  tenderness [No Spinal Tenderness] : no spinal tenderness [No Joint Swelling] : no joint swelling [Grossly Normal Strength/Tone] : grossly normal strength/tone [No Rash] : no rash [Coordination Grossly Intact] : coordination grossly intact [No Focal Deficits] : no focal deficits [Normal Affect] : the affect was normal [Normal Insight/Judgement] : insight and judgment were intact

## 2020-01-08 ENCOUNTER — LABORATORY RESULT (OUTPATIENT)
Age: 50
End: 2020-01-08

## 2020-01-08 ENCOUNTER — APPOINTMENT (OUTPATIENT)
Dept: OBGYN | Facility: CLINIC | Age: 50
End: 2020-01-08
Payer: COMMERCIAL

## 2020-01-08 ENCOUNTER — OUTPATIENT (OUTPATIENT)
Dept: OUTPATIENT SERVICES | Facility: HOSPITAL | Age: 50
LOS: 1 days | End: 2020-01-08
Payer: SELF-PAY

## 2020-01-08 VITALS — BODY MASS INDEX: 29.52 KG/M2 | WEIGHT: 127 LBS | DIASTOLIC BLOOD PRESSURE: 100 MMHG | SYSTOLIC BLOOD PRESSURE: 180 MMHG

## 2020-01-08 DIAGNOSIS — Z90.49 ACQUIRED ABSENCE OF OTHER SPECIFIED PARTS OF DIGESTIVE TRACT: Chronic | ICD-10-CM

## 2020-01-08 DIAGNOSIS — N76.0 ACUTE VAGINITIS: ICD-10-CM

## 2020-01-08 PROCEDURE — 87491 CHLMYD TRACH DNA AMP PROBE: CPT

## 2020-01-08 PROCEDURE — G0463: CPT

## 2020-01-08 PROCEDURE — 87800 DETECT AGNT MULT DNA DIREC: CPT

## 2020-01-08 PROCEDURE — 99213 OFFICE O/P EST LOW 20 MIN: CPT | Mod: GE

## 2020-01-08 PROCEDURE — 87591 N.GONORRHOEAE DNA AMP PROB: CPT

## 2020-01-09 LAB
C TRACH RRNA SPEC QL NAA+PROBE: SIGNIFICANT CHANGE UP
CANDIDA AB TITR SER: SIGNIFICANT CHANGE UP
G VAGINALIS DNA SPEC QL NAA+PROBE: SIGNIFICANT CHANGE UP
N GONORRHOEA RRNA SPEC QL NAA+PROBE: SIGNIFICANT CHANGE UP
SPECIMEN SOURCE: SIGNIFICANT CHANGE UP
T VAGINALIS SPEC QL WET PREP: SIGNIFICANT CHANGE UP

## 2020-01-10 NOTE — REVIEW OF SYSTEMS
[Fever] : no fever [Night Sweats] : no night sweats [Chills] : no chills [Lower Ext Edema] : no lower extremity edema [Chest Pain] : no chest pain [Palpitations] : no palpitations [Shortness Of Breath] : no shortness of breath [Wheezing] : no wheezing [Dyspnea on Exertion] : no dyspnea on exertion [Cough] : no cough [Abdominal Pain] : no abdominal pain [Nausea] : no nausea [Vomiting] : no vomiting

## 2020-01-10 NOTE — HISTORY OF PRESENT ILLNESS
[FreeTextEntry1] : elevated lab result [de-identified] : 50 yo woman with history of pre-DM, HTN, vertigo, allergies, neuropathic pain, presents for f/u due to elevated alk phos. \par \par Elevated alk phos on routine labs - RUQ US showed hepatic steatosis. Remains asymptomatic. Negative CHAUNCEY and anti-mitochondrial ab. . \par \par HTN - remains elevated today. Previously on lisinopril in the past. Also with appx 8-10 weight gain.\par \par \par

## 2020-01-15 DIAGNOSIS — R74.8 ABNORMAL LEVELS OF OTHER SERUM ENZYMES: ICD-10-CM

## 2020-01-16 NOTE — BEGINNING OF VISIT
[Patient] : patient [] :  [[employer] ] : [employer]   [FreeTextEntry1] : [id number] [TWNoteComboBox1] : [place]

## 2020-01-16 NOTE — PHYSICAL EXAM
[Awake] : awake [Alert] : alert [Soft] : soft [Oriented x3] : oriented to person, place, and time [Vulvar Atrophy] : vulvar atrophy [Normal] : uterus [Labia Majora] : labia major [Labia Minora] : labia minora [Atrophy] : atrophy [Dry Mucosa] : dry mucosa [No Bleeding] : there was no active vaginal bleeding [Normal Position] : in a normal position [Uterine Adnexae] : were not tender and not enlarged [RRR, No Murmurs] : RRR, no murmurs [CTAB] : CTAB [Acute Distress] : no acute distress [LAD] : no lymphadenopathy [Thyroid Nodule] : no thyroid nodule [Mass] : no breast mass [Nipple Discharge] : no nipple discharge [Axillary LAD] : no axillary lymphadenopathy [Tender] : non tender [Distended] : not distended [Depressed Mood] : not depressed [Flat Affect] : affect not flat [Discharge] : had no discharge [Motion Tenderness] : there was no cervical motion tenderness [Tenderness] : nontender [Adnexa Tenderness] : were not tender

## 2020-01-17 ENCOUNTER — CHART COPY (OUTPATIENT)
Age: 50
End: 2020-01-17

## 2020-01-27 DIAGNOSIS — N89.8 OTHER SPECIFIED NONINFLAMMATORY DISORDERS OF VAGINA: ICD-10-CM

## 2020-02-21 ENCOUNTER — FORM ENCOUNTER (OUTPATIENT)
Age: 50
End: 2020-02-21

## 2020-02-22 ENCOUNTER — APPOINTMENT (OUTPATIENT)
Dept: MAMMOGRAPHY | Facility: IMAGING CENTER | Age: 50
End: 2020-02-22
Payer: COMMERCIAL

## 2020-02-22 ENCOUNTER — OUTPATIENT (OUTPATIENT)
Dept: OUTPATIENT SERVICES | Facility: HOSPITAL | Age: 50
LOS: 1 days | End: 2020-02-22
Payer: SELF-PAY

## 2020-02-22 DIAGNOSIS — Z00.8 ENCOUNTER FOR OTHER GENERAL EXAMINATION: ICD-10-CM

## 2020-02-22 DIAGNOSIS — Z90.49 ACQUIRED ABSENCE OF OTHER SPECIFIED PARTS OF DIGESTIVE TRACT: Chronic | ICD-10-CM

## 2020-02-22 PROCEDURE — 77063 BREAST TOMOSYNTHESIS BI: CPT | Mod: 26

## 2020-02-22 PROCEDURE — 77067 SCR MAMMO BI INCL CAD: CPT | Mod: 26

## 2020-02-22 PROCEDURE — 77063 BREAST TOMOSYNTHESIS BI: CPT

## 2020-02-22 PROCEDURE — 77067 SCR MAMMO BI INCL CAD: CPT

## 2020-03-11 ENCOUNTER — APPOINTMENT (OUTPATIENT)
Dept: INTERNAL MEDICINE | Facility: CLINIC | Age: 50
End: 2020-03-11

## 2020-03-11 ENCOUNTER — OUTPATIENT (OUTPATIENT)
Dept: OUTPATIENT SERVICES | Facility: HOSPITAL | Age: 50
LOS: 1 days | End: 2020-03-11
Payer: SELF-PAY

## 2020-03-11 VITALS
SYSTOLIC BLOOD PRESSURE: 118 MMHG | OXYGEN SATURATION: 99 % | HEART RATE: 76 BPM | DIASTOLIC BLOOD PRESSURE: 80 MMHG | BODY MASS INDEX: 29.05 KG/M2 | WEIGHT: 125 LBS

## 2020-03-11 VITALS — DIASTOLIC BLOOD PRESSURE: 80 MMHG | SYSTOLIC BLOOD PRESSURE: 120 MMHG

## 2020-03-11 DIAGNOSIS — I10 ESSENTIAL (PRIMARY) HYPERTENSION: ICD-10-CM

## 2020-03-11 DIAGNOSIS — Z90.49 ACQUIRED ABSENCE OF OTHER SPECIFIED PARTS OF DIGESTIVE TRACT: Chronic | ICD-10-CM

## 2020-03-11 PROCEDURE — 90732 PPSV23 VACC 2 YRS+ SUBQ/IM: CPT

## 2020-03-11 PROCEDURE — G0463: CPT | Mod: 25

## 2020-03-11 PROCEDURE — G0009: CPT

## 2020-03-16 NOTE — HISTORY OF PRESENT ILLNESS
[[employer] ] : provided by [employer]   [FreeTextEntry1] : [id number] [de-identified] : 50 y/o Female with PMH of pre-DM, HTN, vertigo, allergies, and neuropathic pain presents today for comprehensive physical exam.\par Had mammography and pap smear done in January, 2020 and would like to discuss results today because she does not speak English and did not understand when her provider called her.\par \par HTN- patient was prescribed Lisinopril 5 mg oral tablet at the last visit and she has been taking it every day with no side effects.\par Exercise- Patient does cardio workouts three times a week for 20-30 minutes.\par Diet- Rice, beans, steak, meat, has tried to have smaller portions.\par \par Neuropathic pain- Pain is resolving with physical therapy. Denies weakness or tingling.\par \par Patient denies chest pain, shortness of breath, abdominal pain, weight change, recent travel, or recent infection.

## 2020-03-16 NOTE — PHYSICAL EXAM
[No Acute Distress] : no acute distress [Well Nourished] : well nourished [Well Developed] : well developed [Well-Appearing] : well-appearing [Normal Sclera/Conjunctiva] : normal sclera/conjunctiva [PERRL] : pupils equal round and reactive to light [EOMI] : extraocular movements intact [Normal Outer Ear/Nose] : the outer ears and nose were normal in appearance [Normal Oropharynx] : the oropharynx was normal [No Respiratory Distress] : no respiratory distress  [No Accessory Muscle Use] : no accessory muscle use [Clear to Auscultation] : lungs were clear to auscultation bilaterally [Normal Rate] : normal rate  [Regular Rhythm] : with a regular rhythm [Normal S1, S2] : normal S1 and S2 [No Murmur] : no murmur heard [Pedal Pulses Present] : the pedal pulses are present [No Edema] : there was no peripheral edema [No Extremity Clubbing/Cyanosis] : no extremity clubbing/cyanosis [Soft] : abdomen soft [Non Tender] : non-tender [Non-distended] : non-distended [No Masses] : no abdominal mass palpated [No HSM] : no HSM [Normal Bowel Sounds] : normal bowel sounds [Normal Posterior Cervical Nodes] : no posterior cervical lymphadenopathy [Normal Anterior Cervical Nodes] : no anterior cervical lymphadenopathy [No CVA Tenderness] : no CVA  tenderness [No Spinal Tenderness] : no spinal tenderness [No Joint Swelling] : no joint swelling [Grossly Normal Strength/Tone] : grossly normal strength/tone [No Rash] : no rash [Coordination Grossly Intact] : coordination grossly intact [No Focal Deficits] : no focal deficits [Normal Gait] : normal gait [Deep Tendon Reflexes (DTR)] : deep tendon reflexes were 2+ and symmetric [Normal Affect] : the affect was normal [Normal Insight/Judgement] : insight and judgment were intact [No JVD] : no jugular venous distention [No Lymphadenopathy] : no lymphadenopathy [Supple] : supple [No Varicosities] : no varicosities [Normal Supraclavicular Nodes] : no supraclavicular lymphadenopathy [de-identified] : R eye ptyregium [de-identified] : L ear fluid behind TM

## 2020-03-16 NOTE — REVIEW OF SYSTEMS
[Negative] : Heme/Lymph [Fever] : no fever [Night Sweats] : no night sweats [Vision Problems] : no vision problems [Sore Throat] : no sore throat [Chest Pain] : no chest pain [Shortness Of Breath] : no shortness of breath [Cough] : no cough [Abdominal Pain] : no abdominal pain [Heartburn] : no heartburn [Dysuria] : no dysuria [Incontinence] : no incontinence [Hematuria] : no hematuria [Joint Pain] : no joint pain [Joint Stiffness] : no joint stiffness [Muscle Pain] : no muscle pain [Itching] : no itching [Skin Rash] : no skin rash

## 2020-03-16 NOTE — HEALTH RISK ASSESSMENT
[Yes] : Yes [Monthly or less (1 pt)] : Monthly or less (1 point) [1 or 2 (0 pts)] : 1 or 2 (0 points) [0] : 2) Feeling down, depressed, or hopeless: Not at all (0) [Employed] : employed [Significant Other] : lives with significant other [Sexually Active] : sexually active [Smoke Detector] : smoke detector [Carbon Monoxide Detector] : carbon monoxide detector [Seat Belt] :  uses seat belt [Good] : ~his/her~  mood as  good [Never (0 pts)] : Never (0 points) [Patient reported mammogram was normal] : Patient reported mammogram was normal [Patient reported PAP Smear was normal] : Patient reported PAP Smear was normal [Fully functional (bathing, dressing, toileting, transferring, walking, feeding)] : Fully functional (bathing, dressing, toileting, transferring, walking, feeding) [Fully functional (using the telephone, shopping, preparing meals, housekeeping, doing laundry, using] : Fully functional and needs no help or supervision to perform IADLs (using the telephone, shopping, preparing meals, housekeeping, doing laundry, using transportation, managing medications and managing finances) [] : No [Audit-CScore] : 1 [RCV4Krdcy] : 0 [Reports changes in hearing] : Reports no changes in hearing [Reports changes in vision] : Reports no changes in vision [Reports changes in dental health] : Reports no changes in dental health [Guns at Home] : no guns at home [MammogramDate] : 01/20 [PapearDate] : 85780 [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

## 2020-03-18 DIAGNOSIS — G57.92 UNSPECIFIED MONONEUROPATHY OF LEFT LOWER LIMB: ICD-10-CM

## 2020-03-18 DIAGNOSIS — K75.81 NONALCOHOLIC STEATOHEPATITIS (NASH): ICD-10-CM

## 2020-09-09 ENCOUNTER — APPOINTMENT (OUTPATIENT)
Dept: INTERNAL MEDICINE | Facility: CLINIC | Age: 50
End: 2020-09-09

## 2020-09-09 ENCOUNTER — OUTPATIENT (OUTPATIENT)
Dept: OUTPATIENT SERVICES | Facility: HOSPITAL | Age: 50
LOS: 1 days | End: 2020-09-09
Payer: SELF-PAY

## 2020-09-09 VITALS
OXYGEN SATURATION: 99 % | HEART RATE: 75 BPM | BODY MASS INDEX: 29.29 KG/M2 | SYSTOLIC BLOOD PRESSURE: 120 MMHG | WEIGHT: 126 LBS | DIASTOLIC BLOOD PRESSURE: 80 MMHG

## 2020-09-09 DIAGNOSIS — Z90.49 ACQUIRED ABSENCE OF OTHER SPECIFIED PARTS OF DIGESTIVE TRACT: Chronic | ICD-10-CM

## 2020-09-09 DIAGNOSIS — I10 ESSENTIAL (PRIMARY) HYPERTENSION: ICD-10-CM

## 2020-09-09 PROCEDURE — G0463: CPT

## 2020-09-09 NOTE — REVIEW OF SYSTEMS
[Discharge] : no discharge [Fever] : no fever [Earache] : no earache [Chest Pain] : no chest pain [Shortness Of Breath] : no shortness of breath [Abdominal Pain] : no abdominal pain [Joint Pain] : no joint pain [Dysuria] : no dysuria [Suicidal] : not suicidal [Headache] : no headache [Skin Rash] : no skin rash [Easy Bleeding] : no easy bleeding

## 2020-09-09 NOTE — PLAN
[FreeTextEntry1] : right shoulder pain, likely muscle strain\par -advised to take advil and monitor, can call clinic if not improving. \par \par PINO (nonalcoholic steatohepatitis) \par -Last US showed fatty liver in 2019. Counseled on diet and exercise. \par    \par Essential hypertension \par -BP well controlled with lisinopril. Cont to monitor on current dose.\par \par Neuropathy of left lower extremity \par -resolved\par \par HCM\par mammo 2/2020 BIRADS 2\par next pap in 2021\par UTD on vaccines\par \par Dw Dr Murphy

## 2020-09-09 NOTE — PHYSICAL EXAM
[No Acute Distress] : no acute distress [Normal Sclera/Conjunctiva] : normal sclera/conjunctiva [Normal Outer Ear/Nose] : the outer ears and nose were normal in appearance [No Respiratory Distress] : no respiratory distress  [No JVD] : no jugular venous distention [No Edema] : there was no peripheral edema [Normal Rate] : normal rate  [Soft] : abdomen soft [Non Tender] : non-tender [No HSM] : no HSM [No Joint Swelling] : no joint swelling [No CVA Tenderness] : no CVA  tenderness [No Rash] : no rash [Coordination Grossly Intact] : coordination grossly intact [de-identified] : full ROM of left shoulder, negative castanon and empty can tests, some pain with movement

## 2020-09-09 NOTE — HISTORY OF PRESENT ILLNESS
[[employer] ] : provided by [employer]   [FreeTextEntry1] : follow up  [TWNoteComboBox1] : Tongan [FreeTextEntry2] : Luis  [de-identified] : 49F with PMH of pre-DM, HTN, vertigo, allergies presents today for follow up. Had CPE in march and was told to return for follow up. She has no complaints overall but does notes that she developed pain in her right shoulder last week. 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 Has applied bengay with only partial relief. Has not tried any oral medications. \par \par HTN- taking lisinopril \par Neuropathic pain- resolved. States she used to have pain in her leg but went to PT and it went away.

## 2020-09-23 DIAGNOSIS — K75.81 NONALCOHOLIC STEATOHEPATITIS (NASH): ICD-10-CM

## 2020-09-23 DIAGNOSIS — G57.92 UNSPECIFIED MONONEUROPATHY OF LEFT LOWER LIMB: ICD-10-CM

## 2020-09-23 DIAGNOSIS — H81.12 BENIGN PAROXYSMAL VERTIGO, LEFT EAR: ICD-10-CM

## 2020-12-23 ENCOUNTER — APPOINTMENT (OUTPATIENT)
Dept: ORTHOPEDIC SURGERY | Facility: HOSPITAL | Age: 50
End: 2020-12-23

## 2020-12-23 ENCOUNTER — OUTPATIENT (OUTPATIENT)
Dept: OUTPATIENT SERVICES | Facility: HOSPITAL | Age: 50
LOS: 1 days | End: 2020-12-23
Payer: SELF-PAY

## 2020-12-23 DIAGNOSIS — M25.50 PAIN IN UNSPECIFIED JOINT: ICD-10-CM

## 2020-12-23 DIAGNOSIS — Z90.49 ACQUIRED ABSENCE OF OTHER SPECIFIED PARTS OF DIGESTIVE TRACT: Chronic | ICD-10-CM

## 2020-12-23 DIAGNOSIS — B35.1 TINEA UNGUIUM: ICD-10-CM

## 2020-12-23 PROCEDURE — G0463: CPT

## 2021-03-03 ENCOUNTER — OUTPATIENT (OUTPATIENT)
Dept: OUTPATIENT SERVICES | Facility: HOSPITAL | Age: 51
LOS: 1 days | End: 2021-03-03
Payer: SELF-PAY

## 2021-03-03 ENCOUNTER — LABORATORY RESULT (OUTPATIENT)
Age: 51
End: 2021-03-03

## 2021-03-03 ENCOUNTER — APPOINTMENT (OUTPATIENT)
Dept: INTERNAL MEDICINE | Facility: CLINIC | Age: 51
End: 2021-03-03

## 2021-03-03 VITALS
BODY MASS INDEX: 29.05 KG/M2 | DIASTOLIC BLOOD PRESSURE: 80 MMHG | OXYGEN SATURATION: 98 % | SYSTOLIC BLOOD PRESSURE: 120 MMHG | WEIGHT: 125 LBS | HEART RATE: 73 BPM

## 2021-03-03 DIAGNOSIS — I10 ESSENTIAL (PRIMARY) HYPERTENSION: ICD-10-CM

## 2021-03-03 DIAGNOSIS — Z90.49 ACQUIRED ABSENCE OF OTHER SPECIFIED PARTS OF DIGESTIVE TRACT: Chronic | ICD-10-CM

## 2021-03-03 DIAGNOSIS — Z23 ENCOUNTER FOR IMMUNIZATION: ICD-10-CM

## 2021-03-04 ENCOUNTER — NON-APPOINTMENT (OUTPATIENT)
Age: 51
End: 2021-03-04

## 2021-03-04 PROBLEM — Z23 ENCOUNTER FOR IMMUNIZATION: Status: ACTIVE | Noted: 2021-03-03

## 2021-03-04 LAB
ALBUMIN SERPL ELPH-MCNC: 4.7 G/DL — SIGNIFICANT CHANGE UP (ref 3.3–5)
ALP SERPL-CCNC: 155 U/L — HIGH (ref 40–120)
ALT FLD-CCNC: 28 U/L — SIGNIFICANT CHANGE UP (ref 10–45)
ANION GAP SERPL CALC-SCNC: 14 MMOL/L — SIGNIFICANT CHANGE UP (ref 5–17)
AST SERPL-CCNC: 26 U/L — SIGNIFICANT CHANGE UP (ref 10–40)
BILIRUB SERPL-MCNC: <0.2 MG/DL — SIGNIFICANT CHANGE UP (ref 0.2–1.2)
BUN SERPL-MCNC: 13 MG/DL — SIGNIFICANT CHANGE UP (ref 7–23)
CALCIUM SERPL-MCNC: 9.7 MG/DL — SIGNIFICANT CHANGE UP (ref 8.4–10.5)
CHLORIDE SERPL-SCNC: 102 MMOL/L — SIGNIFICANT CHANGE UP (ref 96–108)
CHOLEST SERPL-MCNC: 208 MG/DL — HIGH
CO2 SERPL-SCNC: 26 MMOL/L — SIGNIFICANT CHANGE UP (ref 22–31)
CREAT SERPL-MCNC: 0.83 MG/DL — SIGNIFICANT CHANGE UP (ref 0.5–1.3)
GLUCOSE SERPL-MCNC: 69 MG/DL — LOW (ref 70–99)
HCT VFR BLD CALC: 41.9 % — SIGNIFICANT CHANGE UP (ref 34.5–45)
HDLC SERPL-MCNC: 54 MG/DL — SIGNIFICANT CHANGE UP
HGB BLD-MCNC: 13.5 G/DL — SIGNIFICANT CHANGE UP (ref 11.5–15.5)
LIPID PNL WITH DIRECT LDL SERPL: 130 MG/DL — HIGH
MCHC RBC-ENTMCNC: 29.9 PG — SIGNIFICANT CHANGE UP (ref 27–34)
MCHC RBC-ENTMCNC: 32.2 GM/DL — SIGNIFICANT CHANGE UP (ref 32–36)
MCV RBC AUTO: 92.7 FL — SIGNIFICANT CHANGE UP (ref 80–100)
NON HDL CHOLESTEROL: 154 MG/DL — HIGH
PLATELET # BLD AUTO: 381 K/UL — SIGNIFICANT CHANGE UP (ref 150–400)
POTASSIUM SERPL-MCNC: 5.3 MMOL/L — SIGNIFICANT CHANGE UP (ref 3.5–5.3)
POTASSIUM SERPL-SCNC: 5.3 MMOL/L — SIGNIFICANT CHANGE UP (ref 3.5–5.3)
PROT SERPL-MCNC: 7.3 G/DL — SIGNIFICANT CHANGE UP (ref 6–8.3)
RBC # BLD: 4.52 M/UL — SIGNIFICANT CHANGE UP (ref 3.8–5.2)
RBC # FLD: 12.5 % — SIGNIFICANT CHANGE UP (ref 10.3–14.5)
SODIUM SERPL-SCNC: 143 MMOL/L — SIGNIFICANT CHANGE UP (ref 135–145)
TRIGL SERPL-MCNC: 124 MG/DL — SIGNIFICANT CHANGE UP
WBC # BLD: 8.4 K/UL — SIGNIFICANT CHANGE UP (ref 3.8–10.5)
WBC # FLD AUTO: 8.4 K/UL — SIGNIFICANT CHANGE UP (ref 3.8–10.5)

## 2021-03-05 ENCOUNTER — LABORATORY RESULT (OUTPATIENT)
Age: 51
End: 2021-03-05

## 2021-03-05 PROCEDURE — 87338 HPYLORI STOOL AG IA: CPT

## 2021-03-05 PROCEDURE — 80061 LIPID PANEL: CPT

## 2021-03-05 PROCEDURE — G0463: CPT | Mod: 25

## 2021-03-05 PROCEDURE — 80053 COMPREHEN METABOLIC PANEL: CPT

## 2021-03-05 PROCEDURE — G0008: CPT | Mod: 25

## 2021-03-05 PROCEDURE — 85027 COMPLETE CBC AUTOMATED: CPT

## 2021-03-05 PROCEDURE — 90688 IIV4 VACCINE SPLT 0.5 ML IM: CPT

## 2021-03-08 LAB — H PYLORI AG STL QL: DETECTED

## 2021-03-08 NOTE — REVIEW OF SYSTEMS
[Itching] : Itching [Fever] : no fever [Chills] : no chills [Recent Change In Weight] : ~T no recent weight change [Discharge] : no discharge [Vision Problems] : no vision problems [Earache] : no earache [Hearing Loss] : no hearing loss [Hoarseness] : no hoarseness [Nasal Discharge] : no nasal discharge [Sore Throat] : no sore throat [Chest Pain] : no chest pain [Palpitations] : no palpitations [Leg Claudication] : no leg claudication [Lower Ext Edema] : no lower extremity edema [Shortness Of Breath] : no shortness of breath [Abdominal Pain] : no abdominal pain [Constipation] : no constipation [Diarrhea] : diarrhea [Vomiting] : no vomiting [Dysuria] : no dysuria [Joint Pain] : no joint pain [Muscle Pain] : no muscle pain [Back Pain] : no back pain [Anxiety] : no anxiety [Depression] : no depression [FreeTextEntry4] : swelling and yellow drainage in her left ear  [FreeTextEntry7] : burning in the pit of my stomach, gnawing  [de-identified] : occasional migraine that resolves with 1 tab of NSAID

## 2021-03-08 NOTE — ASSESSMENT
[FreeTextEntry1] : #HCM\par GI referral for screening colonoscopy\par GYN referral given for pap smear\par mammo referral & Dental referral provided also\par podiatry referral provided \par \par d/w Dr. Murphy

## 2021-03-08 NOTE — PHYSICAL EXAM
[PERRL] : pupils equal round and reactive to light [EOMI] : extraocular movements intact [Normal Outer Ear/Nose] : the outer ears and nose were normal in appearance [Supple] : supple [No Respiratory Distress] : no respiratory distress  [No Accessory Muscle Use] : no accessory muscle use [Clear to Auscultation] : lungs were clear to auscultation bilaterally [Normal Rate] : normal rate  [Regular Rhythm] : with a regular rhythm [Normal S1, S2] : normal S1 and S2 [No Murmur] : no murmur heard [No Edema] : there was no peripheral edema [Soft] : abdomen soft [Non-distended] : non-distended [Normal Bowel Sounds] : normal bowel sounds [Grossly Normal Strength/Tone] : grossly normal strength/tone [Alert and Oriented x3] : oriented to person, place, and time [Normal Insight/Judgement] : insight and judgment were intact [de-identified] : some discomfort with insertion of otoscope in L ear, wax visualized [de-identified] : epigastric tenderness, abdominal obesity [de-identified] : hypopogmented spots on forearms b/l

## 2021-03-08 NOTE — HEALTH RISK ASSESSMENT
[No] : No [0] : 2) Feeling down, depressed, or hopeless: Not at all (0) [With Family] : lives with family [Employed] : employed [Fully functional (bathing, dressing, toileting, transferring, walking, feeding)] : Fully functional (bathing, dressing, toileting, transferring, walking, feeding) [Fully functional (using the telephone, shopping, preparing meals, housekeeping, doing laundry, using] : Fully functional and needs no help or supervision to perform IADLs (using the telephone, shopping, preparing meals, housekeeping, doing laundry, using transportation, managing medications and managing finances) [Reports changes in dental health] : Reports changes in dental health [] : No [de-identified] : walks 20 minutes in the morning 3-4 times a week [de-identified] : trying to eat less tortilla, rice & more salads. less meat, more veggies. [[id number]] : 0 [Reports changes in hearing] : Reports no changes in hearing [Reports changes in vision] : Reports no changes in vision

## 2021-03-08 NOTE — HISTORY OF PRESENT ILLNESS
[[employer] ] : provided by [employer]   [FreeTextEntry1] : [id number] [FreeTextEntry2] :  Aakash [TWNoteComboBox1] : [place]guill[place] [de-identified] : 50y F with HTN, fatty liver presents for CPE. using fluticasone nasal spray \par htn- lisinopril. does not check bp at home. Denies blurry vision, hematuria. \par soh- lives with daughters and family\par denies toxic habits. cleaning \par fam hx- none\par Pt c/o itchy arms at night. uses alcohol to try to alleviate itching. Denies new detergent, soap, lotions etc\par also c/o 1 toe that grows out "funny: 4th toe in R foot. Tried topical antifungal that did not help\par Wants to schedule gyn appt and requesting a referral. \par States she has a tooth that bothers her when she drinks cold or sweets and wants to see a dentist. \par Has burning epigastric pain x2 weeks. No consistent NSAIDs. Not associated with abd. distention, bloated. Feels some relief with juice. \par from [facility]. \par

## 2021-03-10 DIAGNOSIS — L24.9 IRRITANT CONTACT DERMATITIS, UNSPECIFIED CAUSE: ICD-10-CM

## 2021-03-10 DIAGNOSIS — Z23 ENCOUNTER FOR IMMUNIZATION: ICD-10-CM

## 2021-03-10 DIAGNOSIS — K75.81 NONALCOHOLIC STEATOHEPATITIS (NASH): ICD-10-CM

## 2021-03-10 DIAGNOSIS — B35.1 TINEA UNGUIUM: ICD-10-CM

## 2021-03-10 DIAGNOSIS — R10.816 EPIGASTRIC ABDOMINAL TENDERNESS: ICD-10-CM

## 2021-03-12 RX ORDER — METRONIDAZOLE 250 MG/1
250 TABLET ORAL
Qty: 56 | Refills: 0 | Status: COMPLETED | COMMUNITY
Start: 2021-03-10 | End: 2021-03-12

## 2021-03-12 RX ORDER — TETRACYCLINE HYDROCHLORIDE 500 MG/1
500 CAPSULE ORAL
Qty: 56 | Refills: 0 | Status: COMPLETED | COMMUNITY
Start: 2021-03-10 | End: 2021-03-12

## 2021-03-12 RX ORDER — BISMUTH SUBSALICYLATE 525 MG/30ML
525 LIQUID ORAL 4 TIMES DAILY
Qty: 1680 | Refills: 0 | Status: COMPLETED | COMMUNITY
Start: 2021-03-10 | End: 2021-03-12

## 2021-03-26 ENCOUNTER — NON-APPOINTMENT (OUTPATIENT)
Age: 51
End: 2021-03-26

## 2021-04-13 ENCOUNTER — LABORATORY RESULT (OUTPATIENT)
Age: 51
End: 2021-04-13

## 2021-04-14 ENCOUNTER — APPOINTMENT (OUTPATIENT)
Dept: OBGYN | Facility: CLINIC | Age: 51
End: 2021-04-14
Payer: COMMERCIAL

## 2021-04-14 ENCOUNTER — LABORATORY RESULT (OUTPATIENT)
Age: 51
End: 2021-04-14

## 2021-04-14 ENCOUNTER — RESULT REVIEW (OUTPATIENT)
Age: 51
End: 2021-04-14

## 2021-04-14 ENCOUNTER — OUTPATIENT (OUTPATIENT)
Dept: OUTPATIENT SERVICES | Facility: HOSPITAL | Age: 51
LOS: 1 days | End: 2021-04-14
Payer: SELF-PAY

## 2021-04-14 VITALS — BODY MASS INDEX: 29.98 KG/M2 | WEIGHT: 129 LBS | SYSTOLIC BLOOD PRESSURE: 142 MMHG | DIASTOLIC BLOOD PRESSURE: 94 MMHG

## 2021-04-14 DIAGNOSIS — Z01.419 ENCOUNTER FOR GYNECOLOGICAL EXAMINATION (GENERAL) (ROUTINE) WITHOUT ABNORMAL FINDINGS: ICD-10-CM

## 2021-04-14 DIAGNOSIS — N76.0 ACUTE VAGINITIS: ICD-10-CM

## 2021-04-14 DIAGNOSIS — Z11.3 ENCOUNTER FOR SCREENING FOR INFECTIONS WITH A PREDOMINANTLY SEXUAL MODE OF TRANSMISSION: ICD-10-CM

## 2021-04-14 DIAGNOSIS — Z01.419 ENCOUNTER FOR GYNECOLOGICAL EXAMINATION (GENERAL) (ROUTINE) W/OUT ABNORMAL FINDINGS: ICD-10-CM

## 2021-04-14 DIAGNOSIS — R10.2 PELVIC AND PERINEAL PAIN: ICD-10-CM

## 2021-04-14 DIAGNOSIS — Z90.49 ACQUIRED ABSENCE OF OTHER SPECIFIED PARTS OF DIGESTIVE TRACT: Chronic | ICD-10-CM

## 2021-04-14 LAB — HIV 1+2 AB+HIV1 P24 AG SERPL QL IA: SIGNIFICANT CHANGE UP

## 2021-04-14 PROCEDURE — 87491 CHLMYD TRACH DNA AMP PROBE: CPT

## 2021-04-14 PROCEDURE — 36415 COLL VENOUS BLD VENIPUNCTURE: CPT

## 2021-04-14 PROCEDURE — 87591 N.GONORRHOEAE DNA AMP PROB: CPT

## 2021-04-14 PROCEDURE — 87624 HPV HI-RISK TYP POOLED RSLT: CPT

## 2021-04-14 PROCEDURE — G0463: CPT

## 2021-04-14 PROCEDURE — 87389 HIV-1 AG W/HIV-1&-2 AB AG IA: CPT

## 2021-04-14 PROCEDURE — 87340 HEPATITIS B SURFACE AG IA: CPT

## 2021-04-14 PROCEDURE — 99213 OFFICE O/P EST LOW 20 MIN: CPT

## 2021-04-14 PROCEDURE — 86780 TREPONEMA PALLIDUM: CPT

## 2021-04-14 RX ORDER — CLARITHROMYCIN 500 MG/1
500 TABLET, FILM COATED ORAL
Qty: 28 | Refills: 0 | Status: DISCONTINUED | COMMUNITY
Start: 2021-03-12 | End: 2021-04-14

## 2021-04-14 RX ORDER — AMOXICILLIN 500 MG/1
500 TABLET, FILM COATED ORAL TWICE DAILY
Qty: 56 | Refills: 0 | Status: DISCONTINUED | COMMUNITY
Start: 2021-03-12 | End: 2021-04-14

## 2021-04-14 NOTE — HISTORY OF PRESENT ILLNESS
[FreeTextEntry1] : INT#[id number]\par \par 49yo P4 (LMP 43yo ) with h/o PreDm/HTN/Neuropathic pain/PINO presents for gyn annual without complaint.  Denies PMB.  Pt reports episode of "left ovary pain x 1week"- improved now.  Denies change in bowel or bladder habits.  +occasional stress incontinence - does not desire evaluation at this time.   +Sex active/ monogamous-  desires STD screen.  \par \par \par - PAP 2016 NEg\par - Mammo- 2/2020  Birads 2\par - Colonoscopy- never\par \par Gen health followed - followed by medicine (danny 3/2021)\par - s/p Pfizer Covid vaccine #1 - scheduled 5/4 for completion.

## 2021-04-14 NOTE — DISCUSSION/SUMMARY
[FreeTextEntry1] : 50yp P3- PM annual exam\par - [ ]pap/ HPV collected\par - [ ]mammo referral\par - Pelvic pain-  unlikely Gyn etiology. [ ]pelvic sono\par - [ ]GI referral given \par - STD screen at pt request [ ]gc/ct,  [ ]HIV/HEP/SYPH\par - gen health followed by medicine\par \par RTC for danny/prn or for any PMB\par Edilson Tyson, PAC

## 2021-04-14 NOTE — COUNSELING
[Body Image] : body image [Breast Self Exam] : breast self exam [STD (testing, results, tx)] : STD (testing, results, tx)

## 2021-04-14 NOTE — PHYSICAL EXAM
[Appropriately responsive] : appropriately responsive [Alert] : alert [No Acute Distress] : no acute distress [No Lymphadenopathy] : no lymphadenopathy [Regular Rate Rhythm] : regular rate rhythm [No Murmurs] : no murmurs [Clear to Auscultation B/L] : clear to auscultation bilaterally [Soft] : soft [Non-tender] : non-tender [Non-distended] : non-distended [No HSM] : No HSM [No Lesions] : no lesions [No Mass] : no mass [Oriented x3] : oriented x3 [Examination Of The Breasts] : a normal appearance [No Masses] : no breast masses were palpable [Labia Majora] : normal [Labia Minora] : normal [Atrophy] : atrophy [Normal] : normal [Tenderness] : nontender [Uterine Adnexae] : normal [FreeTextEntry5] : stenotic

## 2021-04-15 ENCOUNTER — NON-APPOINTMENT (OUTPATIENT)
Age: 51
End: 2021-04-15

## 2021-04-15 LAB
C TRACH RRNA SPEC QL NAA+PROBE: SIGNIFICANT CHANGE UP
HBV SURFACE AG SER-ACNC: SIGNIFICANT CHANGE UP
HPV HIGH+LOW RISK DNA PNL CVX: SIGNIFICANT CHANGE UP
N GONORRHOEA RRNA SPEC QL NAA+PROBE: SIGNIFICANT CHANGE UP
SPECIMEN SOURCE: SIGNIFICANT CHANGE UP
T PALLIDUM AB TITR SER: NEGATIVE — SIGNIFICANT CHANGE UP

## 2021-04-17 ENCOUNTER — RESULT REVIEW (OUTPATIENT)
Age: 51
End: 2021-04-17

## 2021-04-17 ENCOUNTER — OUTPATIENT (OUTPATIENT)
Dept: OUTPATIENT SERVICES | Facility: HOSPITAL | Age: 51
LOS: 1 days | End: 2021-04-17
Payer: SELF-PAY

## 2021-04-17 ENCOUNTER — APPOINTMENT (OUTPATIENT)
Dept: MAMMOGRAPHY | Facility: IMAGING CENTER | Age: 51
End: 2021-04-17
Payer: COMMERCIAL

## 2021-04-17 DIAGNOSIS — K75.81 NONALCOHOLIC STEATOHEPATITIS (NASH): ICD-10-CM

## 2021-04-17 DIAGNOSIS — Z90.49 ACQUIRED ABSENCE OF OTHER SPECIFIED PARTS OF DIGESTIVE TRACT: Chronic | ICD-10-CM

## 2021-04-17 LAB — CYTOLOGY SPEC DOC CYTO: SIGNIFICANT CHANGE UP

## 2021-04-17 PROCEDURE — 77067 SCR MAMMO BI INCL CAD: CPT

## 2021-04-17 PROCEDURE — 77063 BREAST TOMOSYNTHESIS BI: CPT | Mod: 26

## 2021-04-17 PROCEDURE — 77067 SCR MAMMO BI INCL CAD: CPT | Mod: 26

## 2021-04-17 PROCEDURE — 77063 BREAST TOMOSYNTHESIS BI: CPT

## 2021-04-18 ENCOUNTER — NON-APPOINTMENT (OUTPATIENT)
Age: 51
End: 2021-04-18

## 2021-04-22 ENCOUNTER — OUTPATIENT (OUTPATIENT)
Dept: OUTPATIENT SERVICES | Facility: HOSPITAL | Age: 51
LOS: 1 days | End: 2021-04-22
Payer: SELF-PAY

## 2021-04-22 ENCOUNTER — APPOINTMENT (OUTPATIENT)
Dept: ULTRASOUND IMAGING | Facility: IMAGING CENTER | Age: 51
End: 2021-04-22
Payer: COMMERCIAL

## 2021-04-22 DIAGNOSIS — Z90.49 ACQUIRED ABSENCE OF OTHER SPECIFIED PARTS OF DIGESTIVE TRACT: Chronic | ICD-10-CM

## 2021-04-22 DIAGNOSIS — N76.0 ACUTE VAGINITIS: ICD-10-CM

## 2021-04-22 DIAGNOSIS — R10.2 PELVIC AND PERINEAL PAIN: ICD-10-CM

## 2021-04-22 PROCEDURE — 76830 TRANSVAGINAL US NON-OB: CPT

## 2021-04-22 PROCEDURE — 76856 US EXAM PELVIC COMPLETE: CPT

## 2021-04-22 PROCEDURE — 76856 US EXAM PELVIC COMPLETE: CPT | Mod: 26

## 2021-04-22 PROCEDURE — 76830 TRANSVAGINAL US NON-OB: CPT | Mod: 26

## 2021-04-27 DIAGNOSIS — N83.292 OTHER OVARIAN CYST, LEFT SIDE: ICD-10-CM

## 2021-05-12 ENCOUNTER — APPOINTMENT (OUTPATIENT)
Dept: INTERNAL MEDICINE | Facility: CLINIC | Age: 51
End: 2021-05-12

## 2021-05-12 ENCOUNTER — OUTPATIENT (OUTPATIENT)
Dept: OUTPATIENT SERVICES | Facility: HOSPITAL | Age: 51
LOS: 1 days | End: 2021-05-12
Payer: SELF-PAY

## 2021-05-12 VITALS
SYSTOLIC BLOOD PRESSURE: 142 MMHG | OXYGEN SATURATION: 99 % | WEIGHT: 125 LBS | BODY MASS INDEX: 29.05 KG/M2 | DIASTOLIC BLOOD PRESSURE: 90 MMHG | HEART RATE: 88 BPM

## 2021-05-12 DIAGNOSIS — I10 ESSENTIAL (PRIMARY) HYPERTENSION: ICD-10-CM

## 2021-05-12 DIAGNOSIS — Z90.49 ACQUIRED ABSENCE OF OTHER SPECIFIED PARTS OF DIGESTIVE TRACT: Chronic | ICD-10-CM

## 2021-05-12 PROCEDURE — G0463: CPT

## 2021-05-12 RX ORDER — PANTOPRAZOLE 40 MG/1
40 TABLET, DELAYED RELEASE ORAL
Qty: 30 | Refills: 0 | Status: DISCONTINUED | COMMUNITY
Start: 2021-03-10 | End: 2021-05-12

## 2021-05-12 RX ORDER — LISINOPRIL 5 MG/1
5 TABLET ORAL
Qty: 90 | Refills: 3 | Status: COMPLETED | COMMUNITY
Start: 2020-01-02 | End: 2021-05-12

## 2021-05-12 NOTE — PHYSICAL EXAM
[No Acute Distress] : no acute distress [No JVD] : no jugular venous distention [No Respiratory Distress] : no respiratory distress  [Clear to Auscultation] : lungs were clear to auscultation bilaterally [Normal Rate] : normal rate  [Regular Rhythm] : with a regular rhythm [Soft] : abdomen soft [Non Tender] : non-tender [Non-distended] : non-distended [No HSM] : no HSM

## 2021-05-12 NOTE — PLAN
[FreeTextEntry1] : #H/ pylori infection s/p quadruple therapy \par -needs test of cure today\par \par #HTN\par -elevated BPs, will increase lisinopril to 10 mg \par \par #L ovarian cysts, multiple simple\par -was seen by GYN. will need to repeat US in one year \par \par #PINO (nonalcoholic steatohepatitis) \par -Last US showed fatty liver in 2019. Counseled on diet and exercise. \par    \par #HCM\par mammo 4/2021 BIRADS 1\par due for pap\par UTD on vaccines\par \par RTC 5 weeks for BP check \par Discussed with Dr Murphy

## 2021-05-12 NOTE — REVIEW OF SYSTEMS
[Fever] : no fever [Chills] : no chills [Chest Pain] : no chest pain [Shortness Of Breath] : no shortness of breath [Abdominal Pain] : no abdominal pain [Nausea] : no nausea [Vomiting] : no vomiting

## 2021-05-12 NOTE — HISTORY OF PRESENT ILLNESS
[FreeTextEntry1] : [id number] [FreeTextEntry2] : Jannette [TWNoteComboBox1] : Japanese [de-identified] : 49F with PMH of pre-DM, HTN, vertigo, allergies presents today for follow up on H. pylori. \par \par Was found to have H/ pylori in March- completed abx. No longer complains of epigastric pain, nausea, or vomiting. No GERD or reflux symptoms. \par  \par HTN- taking lisinopril 5 mg daily. \par \par L ovarian US showed multiple simply cysts. Was seen by GYN, recommended 1 year repeat US.

## 2021-05-14 ENCOUNTER — LABORATORY RESULT (OUTPATIENT)
Age: 51
End: 2021-05-14

## 2021-05-16 LAB — H PYLORI AG STL QL: SIGNIFICANT CHANGE UP

## 2021-05-19 DIAGNOSIS — R10.2 PELVIC AND PERINEAL PAIN: ICD-10-CM

## 2021-05-19 DIAGNOSIS — A04.8 OTHER SPECIFIED BACTERIAL INTESTINAL INFECTIONS: ICD-10-CM

## 2021-06-18 ENCOUNTER — APPOINTMENT (OUTPATIENT)
Dept: INTERNAL MEDICINE | Facility: CLINIC | Age: 51
End: 2021-06-18

## 2021-06-18 ENCOUNTER — OUTPATIENT (OUTPATIENT)
Dept: OUTPATIENT SERVICES | Facility: HOSPITAL | Age: 51
LOS: 1 days | End: 2021-06-18
Payer: SELF-PAY

## 2021-06-18 VITALS
HEART RATE: 61 BPM | SYSTOLIC BLOOD PRESSURE: 118 MMHG | WEIGHT: 125 LBS | DIASTOLIC BLOOD PRESSURE: 82 MMHG | HEIGHT: 55 IN | BODY MASS INDEX: 28.93 KG/M2 | OXYGEN SATURATION: 99 %

## 2021-06-18 DIAGNOSIS — K75.81 NONALCOHOLIC STEATOHEPATITIS (NASH): ICD-10-CM

## 2021-06-18 DIAGNOSIS — Z23 ENCOUNTER FOR IMMUNIZATION: ICD-10-CM

## 2021-06-18 DIAGNOSIS — B35.1 TINEA UNGUIUM: ICD-10-CM

## 2021-06-18 DIAGNOSIS — Z90.49 ACQUIRED ABSENCE OF OTHER SPECIFIED PARTS OF DIGESTIVE TRACT: Chronic | ICD-10-CM

## 2021-06-18 DIAGNOSIS — L24.9 IRRITANT CONTACT DERMATITIS, UNSPECIFIED CAUSE: ICD-10-CM

## 2021-06-18 DIAGNOSIS — R10.816 EPIGASTRIC ABDOMINAL TENDERNESS: ICD-10-CM

## 2021-06-18 PROCEDURE — G0463: CPT

## 2021-06-18 NOTE — HISTORY OF PRESENT ILLNESS
[FreeTextEntry1] : [id number] [FreeTextEntry2] : Valentina [TWNoteComboBox1] : Paraguayan [de-identified] : 49F with PMH of pre-DM, HTN, vertigo, allergies presents today for follow up on BP. \par  \par At last visit we increased her lisinopril 5 mg daily to 10 mg. Has been eating less salt.

## 2021-06-18 NOTE — PLAN
[FreeTextEntry1] : #HTN\par -BP at goal today on lisinopril 10 mg \par -cw current dose \par \par #L ovarian cysts\par -L ovarian US 2021 showed multiple simple cysts. Was seen by GYN, recommended 1 year repeat US. \par \par #PINO (nonalcoholic steatohepatitis) \par -Last US showed fatty liver in 2019. Counseled on diet and exercise. \par    \par #HCM\par mammo 4/2021 BIRADS 1\par pap UTD 2021\par UTD on vaccines, including COVID. had second dose of pfizer on 5/4/21. \par \par RTC 3 months or PRN \par Discussed with Dr Sanchez

## 2021-06-18 NOTE — PHYSICAL EXAM
[No Acute Distress] : no acute distress [No Respiratory Distress] : no respiratory distress  [No Accessory Muscle Use] : no accessory muscle use [Normal Rate] : normal rate  [Regular Rhythm] : with a regular rhythm [No Edema] : there was no peripheral edema [Soft] : abdomen soft [Non Tender] : non-tender [No HSM] : no HSM

## 2021-06-29 DIAGNOSIS — I10 ESSENTIAL (PRIMARY) HYPERTENSION: ICD-10-CM

## 2021-09-28 ENCOUNTER — APPOINTMENT (OUTPATIENT)
Dept: GASTROENTEROLOGY | Facility: HOSPITAL | Age: 51
End: 2021-09-28

## 2021-09-28 ENCOUNTER — OUTPATIENT (OUTPATIENT)
Dept: OUTPATIENT SERVICES | Facility: HOSPITAL | Age: 51
LOS: 1 days | End: 2021-09-28
Payer: SELF-PAY

## 2021-09-28 VITALS
BODY MASS INDEX: 30.32 KG/M2 | WEIGHT: 131 LBS | DIASTOLIC BLOOD PRESSURE: 90 MMHG | HEIGHT: 55 IN | HEART RATE: 67 BPM | TEMPERATURE: 96.4 F | SYSTOLIC BLOOD PRESSURE: 154 MMHG

## 2021-09-28 DIAGNOSIS — K75.81 NONALCOHOLIC STEATOHEPATITIS (NASH): ICD-10-CM

## 2021-09-28 DIAGNOSIS — E66.9 OBESITY, UNSPECIFIED: ICD-10-CM

## 2021-09-28 DIAGNOSIS — R10.816 EPIGASTRIC ABDOMINAL TENDERNESS: ICD-10-CM

## 2021-09-28 DIAGNOSIS — Z86.19 PERSONAL HISTORY OF OTHER INFECTIOUS AND PARASITIC DISEASES: ICD-10-CM

## 2021-09-28 DIAGNOSIS — K76.9 LIVER DISEASE, UNSPECIFIED: ICD-10-CM

## 2021-09-28 DIAGNOSIS — Z90.49 ACQUIRED ABSENCE OF OTHER SPECIFIED PARTS OF DIGESTIVE TRACT: Chronic | ICD-10-CM

## 2021-09-28 DIAGNOSIS — R74.8 ABNORMAL LEVELS OF OTHER SERUM ENZYMES: ICD-10-CM

## 2021-09-28 PROCEDURE — G0463: CPT

## 2021-09-28 NOTE — REASON FOR VISIT
[Initial Evaluation] : an initial evaluation [FreeTextEntry1] : left lower quadrant pain, dyspepsia with H. pylori infection, colon cancer screening

## 2021-09-28 NOTE — ASSESSMENT
[FreeTextEntry1] : # Abdominal pain - LLQ, not reproducible, in the settings of known ovarian cysts. Not related to meals, no change in bowel habits - low suspicion for GI etiology.\par # Resolved H.Pylori infection - no EGD done, based on stool tests. Will plan for EGD to assess for gastric metaplasia. \par # Colorectal screening - average risk for CRC. Discussed screening options - elected for colonoscopy.\par # Elevated ALKP - with reported diagnosis of NAFLD on OSH RUQ US. \par # Obesity BMI 30\par \par Recommendations:\par - RUQ US\par - Colonoscopy + EGD\par - Prep ordered\par -  Dietary strategies discussed with the patient including use of psyllium husk before breakfast and supper and Glucerna in place of lunch; mild exercise also discussed; weight once weekly; think of calory spending \par

## 2021-09-28 NOTE — END OF VISIT
[] : Fellow [FreeTextEntry3] : As modified and discussed with patient\par MD KELLI Kim FACN[facility]\par Associate Professor of Medicine\par Lesa Maj[facility]St[facility]\par

## 2021-09-28 NOTE — HISTORY OF PRESENT ILLNESS
[Heartburn] : denies heartburn [Nausea] : denies nausea [Vomiting] : denies vomiting [Diarrhea] : denies diarrhea [Constipation] : denies constipation [Yellow Skin Or Eyes (Jaundice)] : denies jaundice [Abdominal Pain] : denies abdominal pain [Abdominal Swelling] : denies abdominal swelling [Rectal Pain] : denies rectal pain [de-identified] : Ms Norman is a 50 F with PMH of pre-DM, HTN, vertigo, allergies who presents for colonoscopy screening.\par \par Patient reports intermittent LLQ pain, mild, non radiating, not related to meals or exertion. She was evaluated by OBGYN, pelvic US with multiple ovarian cysts. Was recommended GI evaluation as well. No melena or hematochezia. She also had dyspeptic symptoms a few months ago and was found to be H. pylori positive by stool antigen testing.  She was treated with resolution of infection by stool antigen.  \par \par No family history of colorectal cancer. No weight changes, no change in bowel habits. No constipation or diarrhea. Patient reports previous diagnosis of NAFLD with an US (not on file). No EtOH or drug use. Never had a colonoscopy.

## 2021-09-28 NOTE — PHYSICAL EXAM
[General Appearance - Alert] : alert [General Appearance - In No Acute Distress] : in no acute distress [Neck Appearance] : the appearance of the neck was normal [Heart Rate And Rhythm] : heart rate was normal and rhythm regular [Abdomen Soft] : soft [Abdomen Tenderness] : non-tender [] : no hepato-splenomegaly [Oriented To Time, Place, And Person] : oriented to person, place, and time

## 2021-10-08 ENCOUNTER — APPOINTMENT (OUTPATIENT)
Dept: ULTRASOUND IMAGING | Facility: CLINIC | Age: 51
End: 2021-10-08
Payer: COMMERCIAL

## 2021-10-08 ENCOUNTER — OUTPATIENT (OUTPATIENT)
Dept: OUTPATIENT SERVICES | Facility: HOSPITAL | Age: 51
LOS: 1 days | End: 2021-10-08
Payer: SELF-PAY

## 2021-10-08 DIAGNOSIS — K75.81 NONALCOHOLIC STEATOHEPATITIS (NASH): ICD-10-CM

## 2021-10-08 DIAGNOSIS — Z90.49 ACQUIRED ABSENCE OF OTHER SPECIFIED PARTS OF DIGESTIVE TRACT: Chronic | ICD-10-CM

## 2021-10-08 DIAGNOSIS — R10.816 EPIGASTRIC ABDOMINAL TENDERNESS: ICD-10-CM

## 2021-10-08 DIAGNOSIS — E66.9 OBESITY, UNSPECIFIED: ICD-10-CM

## 2021-10-08 DIAGNOSIS — R74.8 ABNORMAL LEVELS OF OTHER SERUM ENZYMES: ICD-10-CM

## 2021-10-08 PROCEDURE — 76705 ECHO EXAM OF ABDOMEN: CPT | Mod: 26

## 2021-10-08 PROCEDURE — 76705 ECHO EXAM OF ABDOMEN: CPT

## 2021-10-25 ENCOUNTER — OUTPATIENT (OUTPATIENT)
Dept: OUTPATIENT SERVICES | Facility: HOSPITAL | Age: 51
LOS: 1 days | End: 2021-10-25
Payer: MEDICARE

## 2021-10-25 DIAGNOSIS — Z11.52 ENCOUNTER FOR SCREENING FOR COVID-19: ICD-10-CM

## 2021-10-25 DIAGNOSIS — Z90.49 ACQUIRED ABSENCE OF OTHER SPECIFIED PARTS OF DIGESTIVE TRACT: Chronic | ICD-10-CM

## 2021-10-25 PROCEDURE — U0003: CPT

## 2021-10-25 PROCEDURE — C9803: CPT

## 2021-10-25 PROCEDURE — U0005: CPT

## 2021-10-26 LAB — SARS-COV-2 RNA SPEC QL NAA+PROBE: SIGNIFICANT CHANGE UP

## 2021-10-28 ENCOUNTER — RESULT REVIEW (OUTPATIENT)
Age: 51
End: 2021-10-28

## 2021-10-28 ENCOUNTER — OUTPATIENT (OUTPATIENT)
Dept: OUTPATIENT SERVICES | Facility: HOSPITAL | Age: 51
LOS: 1 days | End: 2021-10-28
Payer: SELF-PAY

## 2021-10-28 VITALS
WEIGHT: 130.07 LBS | DIASTOLIC BLOOD PRESSURE: 77 MMHG | SYSTOLIC BLOOD PRESSURE: 142 MMHG | TEMPERATURE: 97 F | HEIGHT: 57.87 IN | OXYGEN SATURATION: 100 % | RESPIRATION RATE: 12 BRPM | HEART RATE: 58 BPM

## 2021-10-28 VITALS
DIASTOLIC BLOOD PRESSURE: 56 MMHG | OXYGEN SATURATION: 100 % | SYSTOLIC BLOOD PRESSURE: 134 MMHG | HEART RATE: 56 BPM | RESPIRATION RATE: 15 BRPM

## 2021-10-28 DIAGNOSIS — Z86.19 PERSONAL HISTORY OF OTHER INFECTIOUS AND PARASITIC DISEASES: ICD-10-CM

## 2021-10-28 DIAGNOSIS — Z00.00 ENCOUNTER FOR GENERAL ADULT MEDICAL EXAMINATION WITHOUT ABNORMAL FINDINGS: ICD-10-CM

## 2021-10-28 DIAGNOSIS — Z90.49 ACQUIRED ABSENCE OF OTHER SPECIFIED PARTS OF DIGESTIVE TRACT: Chronic | ICD-10-CM

## 2021-10-28 PROCEDURE — 45380 COLONOSCOPY AND BIOPSY: CPT | Mod: PT

## 2021-10-28 PROCEDURE — 88305 TISSUE EXAM BY PATHOLOGIST: CPT | Mod: 26

## 2021-10-28 PROCEDURE — 43235 EGD DIAGNOSTIC BRUSH WASH: CPT

## 2021-10-28 PROCEDURE — 45380 COLONOSCOPY AND BIOPSY: CPT

## 2021-10-28 PROCEDURE — 88305 TISSUE EXAM BY PATHOLOGIST: CPT

## 2021-10-28 NOTE — PRE PROCEDURE NOTE - PRE PROCEDURE EVALUATION
Attending Physician:  Dr Yanez                       Procedure: EGD/colonoscopy    Indication for Procedure: colon cancer screening, abd pain, Hpylori  ________________________________________________________  PAST MEDICAL & SURGICAL HISTORY:  Hypertension    HTN (hypertension)    History of appendectomy    No significant past surgical history      ALLERGIES:  No Known Allergies    HOME MEDICATIONS:  lisinopril:  orally   lisinopril:  orally   meloxicam:  orally     AICD/PPM: [ ] yes   [ x] no    PERTINENT LAB DATA:                      PHYSICAL EXAMINATION:    T(C): --  HR: --  BP: --  RR: --  SpO2: --    Constitutional: NAD  HEENT: PERRLA, EOMI,    Neck:  No JVD  Respiratory: CTAB/L  Cardiovascular: S1 and S2  Gastrointestinal: BS+, soft, NT/ND  Extremities: No peripheral edema  Neurological: A/O x 3, no focal deficits  Psychiatric: Normal mood, normal affect  Skin: No rashes    ASA Class: I [x ]  II [ ]  III [ ]  IV [ ]    COMMENTS:    The patient is a suitable candidate for the planned procedure unless box checked [ ]  No, explain:

## 2021-11-01 LAB — SURGICAL PATHOLOGY STUDY: SIGNIFICANT CHANGE UP

## 2021-11-05 ENCOUNTER — NON-APPOINTMENT (OUTPATIENT)
Age: 51
End: 2021-11-05

## 2021-12-10 ENCOUNTER — OUTPATIENT (OUTPATIENT)
Dept: OUTPATIENT SERVICES | Facility: HOSPITAL | Age: 51
LOS: 1 days | End: 2021-12-10
Payer: SELF-PAY

## 2021-12-10 ENCOUNTER — LABORATORY RESULT (OUTPATIENT)
Age: 51
End: 2021-12-10

## 2021-12-10 ENCOUNTER — APPOINTMENT (OUTPATIENT)
Dept: INTERNAL MEDICINE | Facility: CLINIC | Age: 51
End: 2021-12-10

## 2021-12-10 VITALS
DIASTOLIC BLOOD PRESSURE: 80 MMHG | OXYGEN SATURATION: 98 % | HEIGHT: 55 IN | HEART RATE: 81 BPM | SYSTOLIC BLOOD PRESSURE: 140 MMHG | BODY MASS INDEX: 29.39 KG/M2 | WEIGHT: 127 LBS

## 2021-12-10 DIAGNOSIS — Z90.49 ACQUIRED ABSENCE OF OTHER SPECIFIED PARTS OF DIGESTIVE TRACT: Chronic | ICD-10-CM

## 2021-12-10 DIAGNOSIS — R74.8 ABNORMAL LEVELS OF OTHER SERUM ENZYMES: ICD-10-CM

## 2021-12-10 DIAGNOSIS — K75.81 NONALCOHOLIC STEATOHEPATITIS (NASH): ICD-10-CM

## 2021-12-10 DIAGNOSIS — E66.9 OBESITY, UNSPECIFIED: ICD-10-CM

## 2021-12-10 DIAGNOSIS — I10 ESSENTIAL (PRIMARY) HYPERTENSION: ICD-10-CM

## 2021-12-10 LAB
A1C WITH ESTIMATED AVERAGE GLUCOSE RESULT: 6 % — HIGH (ref 4–5.6)
ALBUMIN SERPL ELPH-MCNC: 4.6 G/DL — SIGNIFICANT CHANGE UP (ref 3.3–5)
ALP SERPL-CCNC: 156 U/L — HIGH (ref 40–120)
ALT FLD-CCNC: 26 U/L — SIGNIFICANT CHANGE UP (ref 10–45)
ANION GAP SERPL CALC-SCNC: 10 MMOL/L — SIGNIFICANT CHANGE UP (ref 5–17)
AST SERPL-CCNC: 23 U/L — SIGNIFICANT CHANGE UP (ref 10–40)
BILIRUB SERPL-MCNC: 0.2 MG/DL — SIGNIFICANT CHANGE UP (ref 0.2–1.2)
BUN SERPL-MCNC: 17 MG/DL — SIGNIFICANT CHANGE UP (ref 7–23)
CALCIUM SERPL-MCNC: 9.7 MG/DL — SIGNIFICANT CHANGE UP (ref 8.4–10.5)
CHLORIDE SERPL-SCNC: 105 MMOL/L — SIGNIFICANT CHANGE UP (ref 96–108)
CHOLEST SERPL-MCNC: 206 MG/DL — HIGH
CO2 SERPL-SCNC: 28 MMOL/L — SIGNIFICANT CHANGE UP (ref 22–31)
CREAT SERPL-MCNC: 0.8 MG/DL — SIGNIFICANT CHANGE UP (ref 0.5–1.3)
ESTIMATED AVERAGE GLUCOSE: 126 MG/DL — HIGH (ref 68–114)
GLUCOSE SERPL-MCNC: 104 MG/DL — HIGH (ref 70–99)
HCT VFR BLD CALC: 42.5 % — SIGNIFICANT CHANGE UP (ref 34.5–45)
HDLC SERPL-MCNC: 50 MG/DL — LOW
HGB BLD-MCNC: 13.8 G/DL — SIGNIFICANT CHANGE UP (ref 11.5–15.5)
LIPID PNL WITH DIRECT LDL SERPL: 130 MG/DL — HIGH
MCHC RBC-ENTMCNC: 30.4 PG — SIGNIFICANT CHANGE UP (ref 27–34)
MCHC RBC-ENTMCNC: 32.5 GM/DL — SIGNIFICANT CHANGE UP (ref 32–36)
MCV RBC AUTO: 93.6 FL — SIGNIFICANT CHANGE UP (ref 80–100)
NON HDL CHOLESTEROL: 155 MG/DL — HIGH
PLATELET # BLD AUTO: 435 K/UL — HIGH (ref 150–400)
POTASSIUM SERPL-MCNC: 4.7 MMOL/L — SIGNIFICANT CHANGE UP (ref 3.5–5.3)
POTASSIUM SERPL-SCNC: 4.7 MMOL/L — SIGNIFICANT CHANGE UP (ref 3.5–5.3)
PROT SERPL-MCNC: 7.5 G/DL — SIGNIFICANT CHANGE UP (ref 6–8.3)
RBC # BLD: 4.54 M/UL — SIGNIFICANT CHANGE UP (ref 3.8–5.2)
RBC # FLD: 12.8 % — SIGNIFICANT CHANGE UP (ref 10.3–14.5)
SODIUM SERPL-SCNC: 143 MMOL/L — SIGNIFICANT CHANGE UP (ref 135–145)
TRIGL SERPL-MCNC: 127 MG/DL — SIGNIFICANT CHANGE UP
WBC # BLD: 7.07 K/UL — SIGNIFICANT CHANGE UP (ref 3.8–10.5)
WBC # FLD AUTO: 7.07 K/UL — SIGNIFICANT CHANGE UP (ref 3.8–10.5)

## 2021-12-10 PROCEDURE — 80061 LIPID PANEL: CPT

## 2021-12-10 PROCEDURE — G0463: CPT

## 2021-12-10 PROCEDURE — 83036 HEMOGLOBIN GLYCOSYLATED A1C: CPT

## 2021-12-10 PROCEDURE — 85027 COMPLETE CBC AUTOMATED: CPT

## 2021-12-10 PROCEDURE — 80053 COMPREHEN METABOLIC PANEL: CPT

## 2021-12-10 PROCEDURE — T1013: CPT

## 2021-12-10 RX ORDER — POLYETHYLENE GLYCOL 3350 17 G/17G
17 POWDER, FOR SOLUTION ORAL
Qty: 14 | Refills: 0 | Status: DISCONTINUED | COMMUNITY
Start: 2021-09-28 | End: 2021-12-10

## 2021-12-10 RX ORDER — FLUTICASONE PROPIONATE 50 UG/1
50 SPRAY, METERED NASAL
Qty: 1 | Refills: 5 | Status: DISCONTINUED | COMMUNITY
Start: 2017-06-06 | End: 2021-12-10

## 2021-12-10 NOTE — PLAN
[FreeTextEntry1] : #Lower back pain\par -sounds like muscle strain given her heavy lifting lately. No warning signs - no saddle anesthesia, trouble walking, severe pain, midline tenderness on exam today. \par -offered PT, lidocaine patch, continue with tylenol given great response to it. \par \par #HTN\par -renew lisinopril 10 mg \par \par #L ovarian cysts\par -L ovarian US 2021 showed multiple simple cysts. Was seen by GYN, recommended 1 year repeat US. \par \par #PINO (nonalcoholic steatohepatitis) \par -check A1C, lipids  \par -reinforced diet and exercise\par \par #Obesity\par -as above  \par  \par #HCM\par mammo 4/2021 BIRADS 1\par colonoscopy repeat in 2026\par pap UTD 2021\par UTD on vaccines, including COVID. had second dose of pfizer on 5/4/21. Received booster 12/1/21.\par flu UTD\par \par RTC 5 weeks for BP check\par Discussed with Dr. Plata

## 2021-12-10 NOTE — REVIEW OF SYSTEMS
[Fever] : no fever [Chills] : no chills [Abdominal Pain] : no abdominal pain [Nausea] : no nausea [Vomiting] : no vomiting [FreeTextEntry9] : as above

## 2021-12-10 NOTE — HISTORY OF PRESENT ILLNESS
[Time Spent: ____ minutes] : Total time spent using  services: [unfilled] minutes. The patient's primary language is not English thus required  services. [FreeTextEntry1] : cpe [Interpreters_IDNumber] : [id number] [Interpreters_FullName] : Florentin [TWNoteComboBox1] : Senegalese [de-identified] : 51F with HTN, PINO, ovarian cyst, presents for CPE. States she ran out of lisinopril 1 week ago. Had not been checking BPs at home. Recently had EGD/colon performed. EGD was normal, colonoscopy showed 4 polyps, not adenomas, per GI needs repeat in 5 years. Also had RUQ US which showed hepatic steatosis. Denies any alcohol use. States her diet is tortillas, plantains, potatoes, rice, beans. Exercises a little, walks around the neighborhood for 30 mins 3 times a week. BMI is 29. \par Complains of lower back pain for a few months. Pain is nonradiating. Localized to lower back. No weakness or numbness. Notes that she has been frequently lifting her 1 year old grandson. Does a lot of household chores. Last night took a bag of trash outside, felt pain in the back and shoulder. Feels that she cannot lift heavy things without the pain. Pain is also in arms. Tried taking tylenol which helps. Pain goes away completely after 3 days if she takes tylenol. Gets these pains frequently. \par Received flu shot in October. Received COVID shots plus booster.

## 2021-12-10 NOTE — PHYSICAL EXAM
[No Acute Distress] : no acute distress [Normal Outer Ear/Nose] : the outer ears and nose were normal in appearance [No JVD] : no jugular venous distention [No Respiratory Distress] : no respiratory distress  [Normal Rate] : normal rate  [Regular Rhythm] : with a regular rhythm [No Edema] : there was no peripheral edema [Soft] : abdomen soft [Non Tender] : non-tender [Non-distended] : non-distended [No Spinal Tenderness] : no spinal tenderness [No Focal Deficits] : no focal deficits [de-identified] : negative SLR

## 2021-12-13 DIAGNOSIS — Z00.00 ENCOUNTER FOR GENERAL ADULT MEDICAL EXAMINATION WITHOUT ABNORMAL FINDINGS: ICD-10-CM

## 2021-12-13 DIAGNOSIS — N83.202 UNSPECIFIED OVARIAN CYST, LEFT SIDE: ICD-10-CM

## 2021-12-13 DIAGNOSIS — M54.50 LOW BACK PAIN, UNSPECIFIED: ICD-10-CM

## 2021-12-17 ENCOUNTER — NON-APPOINTMENT (OUTPATIENT)
Age: 51
End: 2021-12-17

## 2022-01-14 ENCOUNTER — LABORATORY RESULT (OUTPATIENT)
Age: 52
End: 2022-01-14

## 2022-01-14 ENCOUNTER — OUTPATIENT (OUTPATIENT)
Dept: OUTPATIENT SERVICES | Facility: HOSPITAL | Age: 52
LOS: 1 days | End: 2022-01-14
Payer: SELF-PAY

## 2022-01-14 ENCOUNTER — APPOINTMENT (OUTPATIENT)
Dept: INTERNAL MEDICINE | Facility: CLINIC | Age: 52
End: 2022-01-14
Payer: SELF-PAY

## 2022-01-14 VITALS
OXYGEN SATURATION: 98 % | BODY MASS INDEX: 29.39 KG/M2 | HEIGHT: 55 IN | DIASTOLIC BLOOD PRESSURE: 90 MMHG | HEART RATE: 57 BPM | SYSTOLIC BLOOD PRESSURE: 128 MMHG | WEIGHT: 127 LBS

## 2022-01-14 DIAGNOSIS — Z90.49 ACQUIRED ABSENCE OF OTHER SPECIFIED PARTS OF DIGESTIVE TRACT: Chronic | ICD-10-CM

## 2022-01-14 DIAGNOSIS — Z12.2 ENCOUNTER FOR SCREENING FOR MALIGNANT NEOPLASM OF RESPIRATORY ORGANS: ICD-10-CM

## 2022-01-14 DIAGNOSIS — I10 ESSENTIAL (PRIMARY) HYPERTENSION: ICD-10-CM

## 2022-01-14 DIAGNOSIS — N83.202 UNSPECIFIED OVARIAN CYST, LEFT SIDE: ICD-10-CM

## 2022-01-14 PROCEDURE — 87086 URINE CULTURE/COLONY COUNT: CPT

## 2022-01-14 PROCEDURE — 87491 CHLMYD TRACH DNA AMP PROBE: CPT

## 2022-01-14 PROCEDURE — 81001 URINALYSIS AUTO W/SCOPE: CPT

## 2022-01-14 PROCEDURE — ZZZZZ: CPT

## 2022-01-14 PROCEDURE — 87591 N.GONORRHOEAE DNA AMP PROB: CPT

## 2022-01-14 PROCEDURE — G0463: CPT

## 2022-01-15 LAB
APPEARANCE UR: CLEAR — SIGNIFICANT CHANGE UP
BACTERIA # UR AUTO: NEGATIVE — SIGNIFICANT CHANGE UP
BILIRUB UR-MCNC: NEGATIVE — SIGNIFICANT CHANGE UP
C TRACH RRNA SPEC QL NAA+PROBE: SIGNIFICANT CHANGE UP
COD CRY URNS QL: NEGATIVE — SIGNIFICANT CHANGE UP
COLOR SPEC: SIGNIFICANT CHANGE UP
CULTURE RESULTS: SIGNIFICANT CHANGE UP
DIFF PNL FLD: ABNORMAL
EPI CELLS # UR: 0 /HPF — SIGNIFICANT CHANGE UP (ref 0–5)
GLUCOSE UR QL: NEGATIVE — SIGNIFICANT CHANGE UP
GRAN CASTS # UR COMP ASSIST: 0 /LPF — SIGNIFICANT CHANGE UP
HYALINE CASTS # UR AUTO: 0 /LPF — SIGNIFICANT CHANGE UP (ref 0–7)
KETONES UR-MCNC: NEGATIVE — SIGNIFICANT CHANGE UP
LEUKOCYTE ESTERASE UR-ACNC: ABNORMAL
N GONORRHOEA RRNA SPEC QL NAA+PROBE: SIGNIFICANT CHANGE UP
NITRITE UR-MCNC: NEGATIVE — SIGNIFICANT CHANGE UP
PH UR: 8 — SIGNIFICANT CHANGE UP (ref 5–8)
PROT UR-MCNC: NEGATIVE — SIGNIFICANT CHANGE UP
RBC CASTS # UR COMP ASSIST: 9 /HPF — HIGH (ref 0–4)
SP GR SPEC: 1.01 — SIGNIFICANT CHANGE UP (ref 1.01–1.02)
SPECIMEN SOURCE: SIGNIFICANT CHANGE UP
SPECIMEN SOURCE: SIGNIFICANT CHANGE UP
TRI-PHOS CRY UR QL COMP ASSIST: NEGATIVE — SIGNIFICANT CHANGE UP
URATE CRY FLD QL MICRO: NEGATIVE — SIGNIFICANT CHANGE UP
UROBILINOGEN FLD QL: SIGNIFICANT CHANGE UP
WBC UR QL: 17 /HPF — HIGH (ref 0–5)

## 2022-01-19 NOTE — HISTORY OF PRESENT ILLNESS
[[employer] ] : provided by [employer]   [FreeTextEntry1] : BP check-up, follow-up [Interpreters_IDNumber] : [id number] [Interpreters_FullName] : Susi [TWNoteComboBox1] : Moroccan [de-identified] : 51F with HTN, PINO, ovarian cyst, presents for BP check-up. At last visit, restarted on lisinopril after running out 1 week ago. \par \par Today, pt is complaining of pain in the left ovaries. She was told she had simple ovarian cysts at her last gynecology appointment (confirmed on TVUS) with no treatment necessary. The pain started one year ago and comes about every few days. The pain lasts for 1-2 days at a time, is stabbing in quality, and radiates to the groin at times. There are no provoking or alleviating factors, although she knows that it is more uncomfortable when she has a full bladder. She denies any pain with urination, increased urinary frequency, suprapubic tenderness, abnormal vaginal discharge or vaginal bleeding, or blood in urine. Denies known history of kidney stones. No stones seen in urine. Her last menstrual cycle was 6 years ago. She has a hx of chlamydia many years ago but she completed tx at the time. No hx of PID. Pt denies constipation or changes to her BM, no change in bowel caliber. \par \par Pt also complains of "ugly" toenails on her right foot. She denies any pain. Has tried multiple OTC creams and nail treatments without improvement. Concerned about nail infection.  \par

## 2022-01-19 NOTE — ASSESSMENT
[FreeTextEntry1] : 50 y/o F with PMHx of HTN, PINO, ovarian cyst here for follow up and complaining of pain in left ovaries/abdomen, most likely 2/2 ovarian cysts.

## 2022-01-19 NOTE — REVIEW OF SYSTEMS
[Negative] : Gastrointestinal [Abdominal Pain] : no abdominal pain [Constipation] : no constipation [Diarrhea] : diarrhea [Vomiting] : no vomiting [Dysuria] : no dysuria [Incontinence] : no incontinence [Hematuria] : no hematuria [Frequency] : no frequency [Vaginal Discharge] : no vaginal discharge [Dysmenorrhea] : no dysmenorrhea

## 2022-01-19 NOTE — PHYSICAL EXAM
[No Edema] : there was no peripheral edema [Soft] : abdomen soft [Non-distended] : non-distended [No HSM] : no HSM [Normal Bowel Sounds] : normal bowel sounds [External Female Genitalia] : normal external genitalia [Vagina] : normal vaginal exam [Cervix] : normal cervix [No CVA Tenderness] : no CVA  tenderness [Normal] : no rash [No Focal Deficits] : no focal deficits [Normal Gait] : normal gait [de-identified] : tender to palpation in LLQ [FreeTextEntry1] : mild tenderness in Left adnexa on bimanual, no abnormalities palpated on bimanual

## 2022-01-19 NOTE — PLAN
[FreeTextEntry1] : #LLQ pain most likely 2/2 to known ovarian cysts (DDx include GI (sigmoid volvulus, diverticulitis, constipation) but less likely d/t normal colonoscopy and lack of GI sx; or  (kidney stones, PID, ovarian torsion)\par - no cervical motion tenderness on bimanual exam so less likely PID\par - most likely 2/2 to known ovarian cysts. Counseled pt to take NSAIDs as needed\par - UA to check for stones or blood in urine\par - F/u UCx, Urine GC\par -Will need repeat TVUS to evaluate left ovarian cyst for change in size and quality\par -Patient educated on likelihood of recurrence of cysts, agnes post-menopause, and expected intermittent pain. Should call clinic or present to ED if intractable pain, vaginal bleeding, etc\par \par #onychomycosis\par - prescribed ciclopirox. \par - Advised pt to use daily and likely indefinitely as fungal infections are difficult to treat and often recur\par \par Discussed with Dr. Das\par Aga Arnett, MS4\par Yasemin Cruz, PGY-2\par

## 2022-01-21 ENCOUNTER — NON-APPOINTMENT (OUTPATIENT)
Age: 52
End: 2022-01-21

## 2022-05-29 ENCOUNTER — EMERGENCY (EMERGENCY)
Facility: HOSPITAL | Age: 52
LOS: 1 days | Discharge: HOME | End: 2022-05-29
Attending: STUDENT IN AN ORGANIZED HEALTH CARE EDUCATION/TRAINING PROGRAM
Payer: SELF-PAY

## 2022-05-29 VITALS
SYSTOLIC BLOOD PRESSURE: 166 MMHG | HEART RATE: 72 BPM | RESPIRATION RATE: 18 BRPM | OXYGEN SATURATION: 96 % | TEMPERATURE: 99 F | DIASTOLIC BLOOD PRESSURE: 89 MMHG | HEIGHT: 57.87 IN | WEIGHT: 151.9 LBS

## 2022-05-29 DIAGNOSIS — Z90.49 ACQUIRED ABSENCE OF OTHER SPECIFIED PARTS OF DIGESTIVE TRACT: Chronic | ICD-10-CM

## 2022-05-29 PROCEDURE — 99284 EMERGENCY DEPT VISIT MOD MDM: CPT

## 2022-05-29 PROCEDURE — 99284 EMERGENCY DEPT VISIT MOD MDM: CPT | Mod: 25

## 2022-05-29 PROCEDURE — 96374 THER/PROPH/DIAG INJ IV PUSH: CPT

## 2022-05-29 RX ORDER — SODIUM CHLORIDE 9 MG/ML
10 INJECTION INTRAMUSCULAR; INTRAVENOUS; SUBCUTANEOUS ONCE
Refills: 0 | Status: COMPLETED | OUTPATIENT
Start: 2022-05-29 | End: 2022-05-29

## 2022-05-29 RX ORDER — METOCLOPRAMIDE HCL 10 MG
10 TABLET ORAL ONCE
Refills: 0 | Status: COMPLETED | OUTPATIENT
Start: 2022-05-29 | End: 2022-05-29

## 2022-05-29 RX ORDER — ACETAMINOPHEN 500 MG
975 TABLET ORAL ONCE
Refills: 0 | Status: COMPLETED | OUTPATIENT
Start: 2022-05-29 | End: 2022-05-29

## 2022-05-29 RX ADMIN — SODIUM CHLORIDE 10 MILLILITER(S): 9 INJECTION INTRAMUSCULAR; INTRAVENOUS; SUBCUTANEOUS at 17:32

## 2022-05-29 RX ADMIN — Medication 10 MILLIGRAM(S): at 17:27

## 2022-05-29 RX ADMIN — Medication 975 MILLIGRAM(S): at 17:27

## 2022-05-29 NOTE — ED ADULT NURSE NOTE - OBJECTIVE STATEMENT
50y/o F coming to the ED c.o HA. Pt states that shes experiencing x3days of LS HA. pt states that initially the HA was being relived by kyra but no londer today. Pt de 50y/o F coming to the ED c.o HA. Pt states that shes experiencing x3days of LS HA. pt states that initially the HA was being relived by alleve but no longer today. Pt denies any Fever/Chills/N/V/D/vision changes/speech changed. Pt denies any numbness/tingling/weakness. Steady gate noted. On exam pt is breathing spontaneously, able to speak full sentences w//o difficulty, saturating 98% RA. VSS.

## 2022-05-29 NOTE — ED PROVIDER NOTE - PATIENT PORTAL LINK FT
You can access the FollowMyHealth Patient Portal offered by [facility] by registering at the following website: [email or web address]. By joining [facility]’s FollowMyHealth portal, you will also be able to view your health information using other applications (apps) compatible with our system.

## 2022-05-29 NOTE — ED PROVIDER NOTE - NSFOLLOWUPCLINICS_GEN_ALL_ED_FT
[facility]  Neurology  [address] [place] [address] [address] [place] [address]  Phone: [phone]  Fax:

## 2022-05-29 NOTE — ED PROVIDER NOTE - ATTENDING APP SHARED VISIT CONTRIBUTION OF CARE
51 F w/ HTN on lisinopril here w/ L sided h/a non radiating, behind the eye worse w/ loud noises and bright lights gradual onset, pt w/ no cp, no sob, no nausea no vomiting. Pt w/ no parethesias in the arms/legs, no weakness in the arms/legs pt w/ no trauma, not worst h/a of life. pt neuro intact, no meningismus, mild L frontal sinus tenderness,  suspect likely migraine h/a vs sinusitis, pt not immunocompromised afebrile, low suspicion for bacterial infection   used  ellis [id number]

## 2022-05-29 NOTE — ED PROVIDER NOTE - PROGRESS NOTE DETAILS
h/a is minimal at this time, pt able to bend forwards w/ no worsening sx, Headache improved. Feeling well. D/w Dr Gan, stable for dc. F/u w Neurologist. -Julisa Daniel PA-C

## 2022-05-29 NOTE — ED PROVIDER NOTE - CARE PLAN
Principal Discharge DX:	Headache  Assessment and plan of treatment:	L sided headache intermittent x 3 days  -IV hydration  -Reglan  -Tylenol  -Reassess   1

## 2022-05-29 NOTE — ED PROVIDER NOTE - OBJECTIVE STATEMENT
50yo F PMH HTN presents for evaluation of gradual onset intermittent L sided headache x 3 days. Atraumatic. Relieved by Aleve, but then returns. Today, unrelieved by aleve 3 hours ago. Worse w bending over. Two weeks ago with "a lot of mucus" thought to be allergies that has since improved. No fever, chills, vomiting, vision changes, speech changes, motor/sensory changes, localized weakness, neck pain/stiffness.

## 2022-05-29 NOTE — ED PROVIDER NOTE - IV ALTEPLASE DOOR HIDDEN
show Eye Protection Verbiage: Before proceeding with the stage, a plastic scleral shield was inserted. The globe was anesthetized with a few drops of 1% lidocaine with 1:100,000 epinephrine. Then, an appropriate sized scleral shield was chosen and coated with lacrilube ointment. The shield was gently inserted and left in place for the duration of each stage. After the stage was completed, the shield was gently removed.

## 2022-05-29 NOTE — ED ADULT TRIAGE NOTE - CHIEF COMPLAINT QUOTE
left sided headache x 3 days, "when I lean over it feels like my eyes are going to fall out." denies blurry vision. alleviated with alleve.

## 2022-05-31 ENCOUNTER — NON-APPOINTMENT (OUTPATIENT)
Age: 52
End: 2022-05-31

## 2022-05-31 NOTE — DISCUSSION/SUMMARY
[FreeTextEntry1] :  HPI Objective Statement: 50yo F PMH HTN presents for evaluation of gradual\par onset intermittent L sided headache x 3 days. Atraumatic. Relieved by Aleve,\par but then returns. Today, unrelieved by aleve 3 hours ago. Worse w bending over.\par Two weeks ago with "a lot of mucus" thought to be allergies that has since\par improved. No fever, chills, vomiting, vision changes, speech changes,\par motor/sensory changes, localized weakness, neck pain/stiffness.\par

## 2022-08-17 ENCOUNTER — APPOINTMENT (OUTPATIENT)
Dept: INTERNAL MEDICINE | Facility: CLINIC | Age: 52
End: 2022-08-17

## 2022-09-21 ENCOUNTER — OUTPATIENT (OUTPATIENT)
Dept: OUTPATIENT SERVICES | Facility: HOSPITAL | Age: 52
LOS: 1 days | End: 2022-09-21
Payer: SELF-PAY

## 2022-09-21 ENCOUNTER — APPOINTMENT (OUTPATIENT)
Dept: INTERNAL MEDICINE | Facility: CLINIC | Age: 52
End: 2022-09-21

## 2022-09-21 VITALS
HEART RATE: 60 BPM | SYSTOLIC BLOOD PRESSURE: 122 MMHG | BODY MASS INDEX: 28.93 KG/M2 | OXYGEN SATURATION: 96 % | HEIGHT: 55 IN | WEIGHT: 125 LBS | DIASTOLIC BLOOD PRESSURE: 88 MMHG

## 2022-09-21 DIAGNOSIS — Z90.49 ACQUIRED ABSENCE OF OTHER SPECIFIED PARTS OF DIGESTIVE TRACT: Chronic | ICD-10-CM

## 2022-09-21 DIAGNOSIS — I10 ESSENTIAL (PRIMARY) HYPERTENSION: ICD-10-CM

## 2022-09-21 PROCEDURE — ZZZZZ: CPT

## 2022-09-21 PROCEDURE — G0463: CPT

## 2022-09-29 NOTE — INTERPRETER SERVICES
[[employer] ] : provided by [employer]   [Interpreters_IDNumber] : [id number] [Interpreters_FullName] : Stephany [TWNoteComboBox1] : [place]ese

## 2022-09-29 NOTE — ASSESSMENT
[FreeTextEntry1] : 51F with HTN, PINO, ovarian cyst, presents for eye pain.\par \par #bacterial conjunctivitis\par - Pt should use Tobramycin .3% drops in eyes 3x per day 5 days\par -If this does not improve pt should FU with an eye doctor. \par \par #HCM \par -will discuss at next visit: flu, Shingrix, PCV20 \par

## 2022-09-29 NOTE — HISTORY OF PRESENT ILLNESS
[FreeTextEntry1] : eye pain [de-identified] : 51F with HTN, PINO, ovarian cyst, presents for eye pain. \par \par #itchy/pain in R eye \par -1 week ago felt pain and HA around R eye. Started around eye, and then felt pain inside the R eye. Mild pain and discomfort. Took two Tylenol and pain went away. Two days after she had erythema in her conjunctiva and it disappeared on its own. Pt still feels eye is swollen and itchy. Felt like towel covering eye and had slight discharge/crusting in the morning. Denies URI sx, vision changes, N/V, abdominal pain, rashes, chest pain, SOB. No sick contacts, no contact lenses. \par

## 2022-09-29 NOTE — REVIEW OF SYSTEMS
[Discharge] : discharge [Pain] : pain [Redness] : redness [Itching] : itching [Negative] : Respiratory [Dryness] : no dryness  [Vision Problems] : no vision problems [Abdominal Pain] : no abdominal pain [Nausea] : no nausea [Vomiting] : no vomiting [Skin Rash] : no skin rash [FreeTextEntry3] : see hpi

## 2022-09-29 NOTE — PHYSICAL EXAM
[Normal] : soft, non-tender, non-distended, no masses palpated, no HSM and normal bowel sounds [No Rash] : no rash [de-identified] : slight conjunctival erythema in R eye

## 2022-10-04 DIAGNOSIS — H10.9 UNSPECIFIED CONJUNCTIVITIS: ICD-10-CM

## 2022-11-01 ENCOUNTER — APPOINTMENT (OUTPATIENT)
Dept: NEUROLOGY | Facility: HOSPITAL | Age: 52
End: 2022-11-01

## 2022-11-01 ENCOUNTER — OUTPATIENT (OUTPATIENT)
Dept: OUTPATIENT SERVICES | Facility: HOSPITAL | Age: 52
LOS: 1 days | End: 2022-11-01
Payer: SELF-PAY

## 2022-11-01 VITALS
RESPIRATION RATE: 14 BRPM | BODY MASS INDEX: 28.23 KG/M2 | HEART RATE: 57 BPM | WEIGHT: 122 LBS | DIASTOLIC BLOOD PRESSURE: 89 MMHG | HEIGHT: 55 IN | SYSTOLIC BLOOD PRESSURE: 152 MMHG

## 2022-11-01 DIAGNOSIS — Z90.49 ACQUIRED ABSENCE OF OTHER SPECIFIED PARTS OF DIGESTIVE TRACT: Chronic | ICD-10-CM

## 2022-11-01 DIAGNOSIS — G44.209 TENSION-TYPE HEADACHE, UNSPECIFIED, NOT INTRACTABLE: ICD-10-CM

## 2022-11-01 DIAGNOSIS — R29.898 OTHER SYMPTOMS AND SIGNS INVOLVING THE MUSCULOSKELETAL SYSTEM: ICD-10-CM

## 2022-11-01 DIAGNOSIS — R51.9 HEADACHE, UNSPECIFIED: ICD-10-CM

## 2022-11-01 PROCEDURE — G0463: CPT

## 2022-11-01 RX ORDER — TRIAMCINOLONE ACETONIDE 1 MG/ML
0.1 LOTION TOPICAL TWICE DAILY
Qty: 1 | Refills: 0 | Status: DISCONTINUED | COMMUNITY
Start: 2021-03-03 | End: 2022-11-01

## 2022-11-01 RX ORDER — CICLOPIROX 2.28 G/ML
8 SOLUTION TOPICAL
Qty: 1 | Refills: 3 | Status: DISCONTINUED | COMMUNITY
Start: 2022-01-14 | End: 2022-11-01
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
## 2022-11-02 NOTE — PHYSICAL EXAM
[FreeTextEntry1] : Gen: NAD, pleasant\par Head: normocephalic, atraumatic\par Eyes: clear sclera L eye, has pterygium in R eye\par Resp: breathing regularly\par Ears: b/l ear drum swelling, chronic changes, small perforation from using q-tips (advised to not use q-tips in ear)\par \par Neuro\par MS: awake alert oriented person place situation time. Able to ID 3/3 objects, perform simple and complex tasks\par Speech: intact fluency, comprehension, repetition\par CN: PERRL, EOMI, V1-V3 intact b/l, no naomi facial asymmetry, shoulder shrug intact b/l, tongue midline\par \par Motor: GALVAN x4 equal str 5/5 (shoulders, elbow flex/ ext, wrist ext/ flex, , hip flex, knee ext, dorsiflexion, plantarflexion) \par Sensory: LT intact x4 extremities\par Reflexes: +2 biceps, BR, triceps, knee. +1 ankle b/l  \par Coordination: intact FNF\par Gait: baseline \par

## 2022-11-13 ENCOUNTER — APPOINTMENT (OUTPATIENT)
Dept: MRI IMAGING | Facility: IMAGING CENTER | Age: 52
End: 2022-11-13

## 2022-11-13 ENCOUNTER — OUTPATIENT (OUTPATIENT)
Dept: OUTPATIENT SERVICES | Facility: HOSPITAL | Age: 52
LOS: 1 days | End: 2022-11-13
Payer: SELF-PAY

## 2022-11-13 DIAGNOSIS — Z00.00 ENCOUNTER FOR GENERAL ADULT MEDICAL EXAMINATION WITHOUT ABNORMAL FINDINGS: ICD-10-CM

## 2022-11-13 DIAGNOSIS — Z90.49 ACQUIRED ABSENCE OF OTHER SPECIFIED PARTS OF DIGESTIVE TRACT: Chronic | ICD-10-CM

## 2022-11-13 PROCEDURE — 70551 MRI BRAIN STEM W/O DYE: CPT | Mod: 26

## 2022-11-13 PROCEDURE — 70551 MRI BRAIN STEM W/O DYE: CPT

## 2022-12-19 ENCOUNTER — OUTPATIENT (OUTPATIENT)
Dept: OUTPATIENT SERVICES | Facility: HOSPITAL | Age: 52
LOS: 1 days | Discharge: HOME | End: 2022-12-19

## 2022-12-19 ENCOUNTER — APPOINTMENT (OUTPATIENT)
Dept: OTOLARYNGOLOGY | Facility: CLINIC | Age: 52
End: 2022-12-19

## 2022-12-19 VITALS
DIASTOLIC BLOOD PRESSURE: 86 MMHG | WEIGHT: 120 LBS | HEIGHT: 55 IN | SYSTOLIC BLOOD PRESSURE: 155 MMHG | BODY MASS INDEX: 27.77 KG/M2 | HEART RATE: 62 BPM

## 2022-12-19 DIAGNOSIS — Z90.49 ACQUIRED ABSENCE OF OTHER SPECIFIED PARTS OF DIGESTIVE TRACT: Chronic | ICD-10-CM

## 2022-12-19 PROCEDURE — 69210 REMOVE IMPACTED EAR WAX UNI: CPT

## 2022-12-19 PROCEDURE — 99202 OFFICE O/P NEW SF 15 MIN: CPT | Mod: 25

## 2022-12-19 RX ORDER — TOBRAMYCIN 3 MG/ML
0.3 SOLUTION/ DROPS OPHTHALMIC 3 TIMES DAILY
Qty: 1 | Refills: 0 | Status: DISCONTINUED | COMMUNITY
Start: 2022-09-21 | End: 2022-12-19

## 2022-12-22 NOTE — ASSESSMENT
[FreeTextEntry1] : 52 year F  with left cerumen impaction. Patient tolerated cerumen removed without complaints. Physical exam shows ear normal bilateral TM/EAC\par \par Recommend:\par Cerumen Impaction\par -Discussed not using q-tips or instruments to remove wax\par -Olive or mineral oil 1x times every 2 week to keep ear canal lubricated. Discussed that the ear is a self cleaning structure and just allow it clean itself. If wax builds up can try debrox. Once it gets impacted recommend return to get it cleaned out.\par \par -Return to clinic as needed or sooner if new/worsen symptoms present\par

## 2022-12-22 NOTE — HISTORY OF PRESENT ILLNESS
[de-identified] : 52 year old woman presents for initial evaluation for intermittent left otalgia for about 2 years. \par Reports intermittent left ear itchiness, intermittent brown otorrhea and sometimes feels like something is stuck inside the ear.\par Denies recent fevers or ear infections, hearing loss, tinnitus, dizziness, vertigo, headaches related to hearing.

## 2022-12-22 NOTE — PHYSICAL EXAM
[Normal] : mucosa is normal [Midline] : trachea located in midline position [FreeTextEntry9] : cerumen impaction. removed

## 2022-12-28 DIAGNOSIS — H93.8X2 OTHER SPECIFIED DISORDERS OF LEFT EAR: ICD-10-CM

## 2022-12-28 DIAGNOSIS — H61.22 IMPACTED CERUMEN, LEFT EAR: ICD-10-CM

## 2023-01-24 ENCOUNTER — APPOINTMENT (OUTPATIENT)
Dept: NEUROLOGY | Facility: HOSPITAL | Age: 53
End: 2023-01-24

## 2023-01-24 ENCOUNTER — OUTPATIENT (OUTPATIENT)
Dept: OUTPATIENT SERVICES | Facility: HOSPITAL | Age: 53
LOS: 1 days | End: 2023-01-24
Payer: SELF-PAY

## 2023-01-24 VITALS
SYSTOLIC BLOOD PRESSURE: 159 MMHG | BODY MASS INDEX: 27.77 KG/M2 | WEIGHT: 120 LBS | HEART RATE: 67 BPM | RESPIRATION RATE: 14 BRPM | TEMPERATURE: 97.9 F | HEIGHT: 55 IN | DIASTOLIC BLOOD PRESSURE: 84 MMHG

## 2023-01-24 DIAGNOSIS — R51.9 HEADACHE, UNSPECIFIED: ICD-10-CM

## 2023-01-24 DIAGNOSIS — Z90.49 ACQUIRED ABSENCE OF OTHER SPECIFIED PARTS OF DIGESTIVE TRACT: Chronic | ICD-10-CM

## 2023-01-24 PROCEDURE — G0463: CPT

## 2023-01-24 NOTE — PHYSICAL EXAM
[FreeTextEntry1] : General: Well-appearing\par Respiratory: normal, non-labored, even breathing\par CV: Warm and well-perfused extremities\par Skin: no apparent rashes\par Neurological: \par Mental Status: AOx3\par Language: clear, audible speech; no dysarthria, no aphasia\par Cranial Nerves\par II: PERRLA\par III, IV, VI: EOMI; no nystagmus noted\par V: Sensation to light touch present on V1-V3\par VII: full facial expression; no weakness in the eyelid or cheek muscles; no flattening of the NLF\par VIII: normal hearing bilaterally\par XI: SCM muscle elevation noted and lateral head motion noted\par IX, X, XII: no hoarseness in voice; able to move tongue from side-to-side; uvular elevation noted\par Motor: normal bulk, normal tone; moving all extremities with purposeful movement; 5/5 strength in the bilateral upper and lower extremities\par Sensory: sensation to light touch noted in all major dermatomal distributions; proprioception is intact; sensation to temperature intact\par Coordination: normal heel to shin, normal toe walking; no dysmetria\par Reflexes: 2+ bilaterally on the biceps, triceps, brachioradialis, patellar, and Achilles\par Gait: No abnormalities in gait; appropriate heel-strike; no shuffling or spasticity observed\par

## 2023-01-24 NOTE — DISCUSSION/SUMMARY
[FreeTextEntry1] : Patient KEANU is a 53 yo F with HTN, PINO, ovarian cyst, vertigo, recent conjunctivitis who presents to neuro clinic today for follow up for headache.  Overall, the patient reports improvement in her headaches, as she has not had one since the start of PT.  MRI of the head is reassuring.  It is likely that this patient had a temporo-occipital headache given that PT likely helped stretch the muscles in those areas and improved it.  \par \par Impression: \par [ ] Follow up with neuro as needed\par \par Recommendations in agreement with neuro attending.

## 2023-01-24 NOTE — HISTORY OF PRESENT ILLNESS
[FreeTextEntry1] : Tim Corbin (Resident Physician) acted as : [id number]\par \par Ms. Kate Fofana is a 52 year old woman with HTN, PINO, ovarian cyst, BPPV coming in for follow up for headaches.  Ms. Fofana states that since her last visit, she has seen the ENT and has been to PT.  She states that PT has helped her headaches to the point that she hasn't had a headache since starting PT.  She did have an MRI of the head without contrast which was normal.  Overall, she endorses feeling much better with no recurrence of the headaches.\par \par From prior visits: \par Reports going to ER in may due to experiencing pain in half of body hurting and no relief with tylenol. Felt that her eyes were about to "pop out." May 19th to clinic after having pain on Fri, Sat, Sun. Woke up with symptoms of pulsating/ throbbing headache in R upper temple region with associated pain in eyes. Pain radiates laterally on R side of head to back. No associated neck pain or stiffness. Pain was constant throughout the day rather than episodic. The pain has since improved but is mild and not bothersome. Can resolve but reoccur with mild residual pain particularly when waking up. In a month can experience 2 days of mild dull pain. Pain particularly worsens with bending over. Has history of headaches controlled with motrin/ tylenol but episode in May was not controlled. Has had headaches all of her life and has had 3x ER visits for headaches. Prior headaches were similar in nature to ones in may but only difference was that they were not well controlled. Has not been referred for headaches in the past to a neurologist. No fhx of migraines/ headaches.   \par \par \par PMHx: see above\par All: pollen, no meds\par SH: non smoker, nondrinker

## 2023-01-26 ENCOUNTER — APPOINTMENT (OUTPATIENT)
Dept: OPHTHALMOLOGY | Facility: CLINIC | Age: 53
End: 2023-01-26
Payer: COMMERCIAL

## 2023-01-26 ENCOUNTER — NON-APPOINTMENT (OUTPATIENT)
Age: 53
End: 2023-01-26

## 2023-01-26 PROCEDURE — 92004 COMPRE OPH EXAM NEW PT 1/>: CPT

## 2023-03-10 ENCOUNTER — NON-APPOINTMENT (OUTPATIENT)
Age: 53
End: 2023-03-10

## 2023-03-10 ENCOUNTER — APPOINTMENT (OUTPATIENT)
Dept: OPHTHALMOLOGY | Facility: CLINIC | Age: 53
End: 2023-03-10
Payer: COMMERCIAL

## 2023-03-10 PROCEDURE — 92012 INTRM OPH EXAM EST PATIENT: CPT

## 2023-04-19 ENCOUNTER — OUTPATIENT (OUTPATIENT)
Dept: OUTPATIENT SERVICES | Facility: HOSPITAL | Age: 53
LOS: 1 days | End: 2023-04-19
Payer: SELF-PAY

## 2023-04-19 ENCOUNTER — APPOINTMENT (OUTPATIENT)
Dept: INTERNAL MEDICINE | Facility: CLINIC | Age: 53
End: 2023-04-19
Payer: COMMERCIAL

## 2023-04-19 ENCOUNTER — RESULT REVIEW (OUTPATIENT)
Age: 53
End: 2023-04-19

## 2023-04-19 VITALS
HEIGHT: 55 IN | BODY MASS INDEX: 27.31 KG/M2 | WEIGHT: 118 LBS | DIASTOLIC BLOOD PRESSURE: 78 MMHG | OXYGEN SATURATION: 98 % | SYSTOLIC BLOOD PRESSURE: 110 MMHG | HEART RATE: 82 BPM

## 2023-04-19 DIAGNOSIS — Z11.3 ENCOUNTER FOR SCREENING FOR INFECTIONS WITH A PREDOMINANTLY SEXUAL MODE OF TRANSMISSION: ICD-10-CM

## 2023-04-19 DIAGNOSIS — H81.12 BENIGN PAROXYSMAL VERTIGO, LEFT EAR: ICD-10-CM

## 2023-04-19 DIAGNOSIS — Z90.49 ACQUIRED ABSENCE OF OTHER SPECIFIED PARTS OF DIGESTIVE TRACT: Chronic | ICD-10-CM

## 2023-04-19 DIAGNOSIS — L24.9 IRRITANT CONTACT DERMATITIS, UNSPECIFIED CAUSE: ICD-10-CM

## 2023-04-19 DIAGNOSIS — Z87.39 PERSONAL HISTORY OF OTHER DISEASES OF THE MUSCULOSKELETAL SYSTEM AND CONNECTIVE TISSUE: ICD-10-CM

## 2023-04-19 DIAGNOSIS — Z12.2 ENCOUNTER FOR SCREENING FOR MALIGNANT NEOPLASM OF RESPIRATORY ORGANS: ICD-10-CM

## 2023-04-19 DIAGNOSIS — J30.2 OTHER SEASONAL ALLERGIC RHINITIS: ICD-10-CM

## 2023-04-19 DIAGNOSIS — K75.81 NONALCOHOLIC STEATOHEPATITIS (NASH): ICD-10-CM

## 2023-04-19 DIAGNOSIS — E66.9 OBESITY, UNSPECIFIED: ICD-10-CM

## 2023-04-19 DIAGNOSIS — R10.2 PELVIC AND PERINEAL PAIN: ICD-10-CM

## 2023-04-19 DIAGNOSIS — L60.3 NAIL DYSTROPHY: ICD-10-CM

## 2023-04-19 DIAGNOSIS — R10.816 EPIGASTRIC ABDOMINAL TENDERNESS: ICD-10-CM

## 2023-04-19 DIAGNOSIS — I10 ESSENTIAL (PRIMARY) HYPERTENSION: ICD-10-CM

## 2023-04-19 DIAGNOSIS — Z86.69 PERSONAL HISTORY OF OTHER DISEASES OF THE NERVOUS SYSTEM AND SENSE ORGANS: ICD-10-CM

## 2023-04-19 DIAGNOSIS — H61.22 IMPACTED CERUMEN, LEFT EAR: ICD-10-CM

## 2023-04-19 DIAGNOSIS — H93.8X2 OTHER SPECIFIED DISORDERS OF LEFT EAR: ICD-10-CM

## 2023-04-19 DIAGNOSIS — G57.92 UNSPECIFIED MONONEUROPATHY OF LEFT LOWER LIMB: ICD-10-CM

## 2023-04-19 PROCEDURE — 90471 IMMUNIZATION ADMIN: CPT

## 2023-04-19 PROCEDURE — ZZZZZ: CPT

## 2023-04-19 PROCEDURE — G0463: CPT | Mod: 25

## 2023-04-19 PROCEDURE — 90750 HZV VACC RECOMBINANT IM: CPT

## 2023-04-19 RX ORDER — FLUTICASONE PROPIONATE 50 UG/1
50 SPRAY, METERED NASAL
Qty: 1 | Refills: 0 | Status: ACTIVE | COMMUNITY
Start: 2023-04-19 | End: 1900-01-01

## 2023-04-19 RX ORDER — LISINOPRIL 10 MG/1
10 TABLET ORAL
Qty: 90 | Refills: 3 | Status: COMPLETED | COMMUNITY
Start: 2021-05-12 | End: 2023-04-19

## 2023-04-19 NOTE — INTERPRETER SERVICES
[Patient Declined  Services] : - None: Patient declined  services [FreeTextEntry3] : Patient declined professional  offered as physician is fluent in Georgian.\par  [TWNoteComboBox1] : Nigerien

## 2023-04-19 NOTE — PHYSICAL EXAM
[No Acute Distress] : no acute distress [Well Nourished] : well nourished [Well Developed] : well developed [Well-Appearing] : well-appearing [Normal Sclera/Conjunctiva] : normal sclera/conjunctiva [PERRL] : pupils equal round and reactive to light [EOMI] : extraocular movements intact [Normal Outer Ear/Nose] : the outer ears and nose were normal in appearance [Normal Oropharynx] : the oropharynx was normal [No JVD] : no jugular venous distention [No Lymphadenopathy] : no lymphadenopathy [Supple] : supple [Thyroid Normal, No Nodules] : the thyroid was normal and there were no nodules present [No Respiratory Distress] : no respiratory distress  [No Accessory Muscle Use] : no accessory muscle use [Clear to Auscultation] : lungs were clear to auscultation bilaterally [Normal Rate] : normal rate  [Regular Rhythm] : with a regular rhythm [Normal S1, S2] : normal S1 and S2 [No Murmur] : no murmur heard [No Carotid Bruits] : no carotid bruits [No Abdominal Bruit] : a ~M bruit was not heard ~T in the abdomen [No Varicosities] : no varicosities [Pedal Pulses Present] : the pedal pulses are present [No Edema] : there was no peripheral edema [No Palpable Aorta] : no palpable aorta [No Extremity Clubbing/Cyanosis] : no extremity clubbing/cyanosis [Soft] : abdomen soft [Non Tender] : non-tender [Non-distended] : non-distended [No Masses] : no abdominal mass palpated [No HSM] : no HSM [Normal Bowel Sounds] : normal bowel sounds [Normal Posterior Cervical Nodes] : no posterior cervical lymphadenopathy [Normal Anterior Cervical Nodes] : no anterior cervical lymphadenopathy [No CVA Tenderness] : no CVA  tenderness [No Spinal Tenderness] : no spinal tenderness [No Joint Swelling] : no joint swelling [Grossly Normal Strength/Tone] : grossly normal strength/tone [No Rash] : no rash [Coordination Grossly Intact] : coordination grossly intact [No Focal Deficits] : no focal deficits [Normal Gait] : normal gait [Deep Tendon Reflexes (DTR)] : deep tendon reflexes were 2+ and symmetric [Normal Affect] : the affect was normal [Normal Insight/Judgement] : insight and judgment were intact [de-identified] : gum appears peal, teeth look healthy, good oral hygiene [de-identified] : hyperthropic nails with yellowing and irregularity, nails appear dry

## 2023-04-19 NOTE — PLAN
[FreeTextEntry1] : # HCM\par - Mammogram referral today\par - Shingrix today\par - Covid vx x 3, last dose May 2022; advised to get booster pt agreeable\par - UTD Colonoscopy in Nov 2022; repeat in 3 years\par - Influenza vx OCtober 2022\par - PAP/ HPV  UTD 2021 was negative\par - CBC, CMP, Lipid profile, A1C\par \par #HTN\par - has been off Lisinopril 10mg due to running out\par - BP normal today; will discontinue Lisinopril and recheck in 5-10 weeks\par \par # Pale gum\par - on exam appears pale; no hx of abnormal bleed\par - CBC today\par \par # Nail Hypertrophy\par - Podiatry referral\par \par # PINO\par - steatosis seen in [facility] [place] in 2021\par - Chronically elevated ALK Phos\par - repeat [facility] [place]\par - CMP today\par \par RTC PRN or for next CPE.

## 2023-04-19 NOTE — HISTORY OF PRESENT ILLNESS
[FreeTextEntry1] : 51F with HTN, PINO, ovarian cyst, presents for CPE. [de-identified] : 51F with HTN, PINO, ovarian cyst, presents for CPE. Patient had acute complaint of gum paleness.\par # Pale Gums\par - noticed a few weeks ago\par - denied abnormal bleeding from gums. Also denied dark or bloody stools, no blood in the urine, no abnormal bruising\par - denied feeling fatigued\par - Last period 6 yrs ago\par - reported having good appetite, no weight changes\par - Had colonoscopy in Nov 2022; was told everything was normal\par \par # Yellowing of nails\par - nails in Left food are yellow and thickened\par - has tried topical OTC w/o improvement\par - R foot normal\par - denied breakage of the nails, denied bleeding, denied itchiness \par \par No recent travels. No recent illnesses.

## 2023-04-19 NOTE — REVIEW OF SYSTEMS
[Nail Changes] : nail changes [Negative] : Heme/Lymph [Itching] : no itching [Mole Changes] : no mole changes [Hair Changes] : no hair changes [Skin Rash] : no skin rash [de-identified] : pale gums

## 2023-04-20 ENCOUNTER — NON-APPOINTMENT (OUTPATIENT)
Age: 53
End: 2023-04-20

## 2023-04-20 LAB
ALBUMIN SERPL ELPH-MCNC: 4.5 G/DL
ALP BLD-CCNC: 195 U/L
ALT SERPL-CCNC: 45 U/L
ANION GAP SERPL CALC-SCNC: 11 MMOL/L
AST SERPL-CCNC: 30 U/L
BASOPHILS # BLD AUTO: 0.08 K/UL
BASOPHILS NFR BLD AUTO: 0.7 %
BILIRUB SERPL-MCNC: 0.2 MG/DL
BUN SERPL-MCNC: 13 MG/DL
CALCIUM SERPL-MCNC: 9.6 MG/DL
CHLORIDE SERPL-SCNC: 103 MMOL/L
CHOLEST SERPL-MCNC: 221 MG/DL
CO2 SERPL-SCNC: 28 MMOL/L
CREAT SERPL-MCNC: 0.89 MG/DL
EGFR: 78 ML/MIN/1.73M2
EOSINOPHIL # BLD AUTO: 0.48 K/UL
EOSINOPHIL NFR BLD AUTO: 4.4 %
ESTIMATED AVERAGE GLUCOSE: 120 MG/DL
GLUCOSE SERPL-MCNC: 115 MG/DL
HBA1C MFR BLD HPLC: 5.8 %
HCT VFR BLD CALC: 40.6 %
HDLC SERPL-MCNC: 56 MG/DL
HGB BLD-MCNC: 13.5 G/DL
IMM GRANULOCYTES NFR BLD AUTO: 0.3 %
LDLC SERPL CALC-MCNC: 114 MG/DL
LYMPHOCYTES # BLD AUTO: 2.87 K/UL
LYMPHOCYTES NFR BLD AUTO: 26.1 %
MAN DIFF?: NORMAL
MCHC RBC-ENTMCNC: 30.8 PG
MCHC RBC-ENTMCNC: 33.3 GM/DL
MCV RBC AUTO: 92.5 FL
MONOCYTES # BLD AUTO: 0.69 K/UL
MONOCYTES NFR BLD AUTO: 6.3 %
NEUTROPHILS # BLD AUTO: 6.84 K/UL
NEUTROPHILS NFR BLD AUTO: 62.2 %
NONHDLC SERPL-MCNC: 165 MG/DL
PLATELET # BLD AUTO: 366 K/UL
POTASSIUM SERPL-SCNC: 4.2 MMOL/L
PROT SERPL-MCNC: 7.1 G/DL
RBC # BLD: 4.39 M/UL
RBC # FLD: 12.9 %
SODIUM SERPL-SCNC: 142 MMOL/L
TRIGL SERPL-MCNC: 255 MG/DL
WBC # FLD AUTO: 10.99 K/UL

## 2023-04-21 DIAGNOSIS — Z23 ENCOUNTER FOR IMMUNIZATION: ICD-10-CM

## 2023-04-21 DIAGNOSIS — Z00.00 ENCOUNTER FOR GENERAL ADULT MEDICAL EXAMINATION WITHOUT ABNORMAL FINDINGS: ICD-10-CM

## 2023-04-21 DIAGNOSIS — E66.9 OBESITY, UNSPECIFIED: ICD-10-CM

## 2023-04-21 DIAGNOSIS — K75.81 NONALCOHOLIC STEATOHEPATITIS (NASH): ICD-10-CM

## 2023-04-21 DIAGNOSIS — I10 ESSENTIAL (PRIMARY) HYPERTENSION: ICD-10-CM

## 2023-04-21 DIAGNOSIS — J30.2 OTHER SEASONAL ALLERGIC RHINITIS: ICD-10-CM

## 2023-04-21 DIAGNOSIS — L60.3 NAIL DYSTROPHY: ICD-10-CM

## 2023-05-17 ENCOUNTER — OUTPATIENT (OUTPATIENT)
Dept: OUTPATIENT SERVICES | Facility: HOSPITAL | Age: 53
LOS: 1 days | End: 2023-05-17
Payer: SELF-PAY

## 2023-05-17 ENCOUNTER — NON-APPOINTMENT (OUTPATIENT)
Age: 53
End: 2023-05-17

## 2023-05-17 ENCOUNTER — APPOINTMENT (OUTPATIENT)
Dept: ULTRASOUND IMAGING | Facility: IMAGING CENTER | Age: 53
End: 2023-05-17
Payer: COMMERCIAL

## 2023-05-17 DIAGNOSIS — K75.81 NONALCOHOLIC STEATOHEPATITIS (NASH): ICD-10-CM

## 2023-05-17 DIAGNOSIS — Z90.49 ACQUIRED ABSENCE OF OTHER SPECIFIED PARTS OF DIGESTIVE TRACT: Chronic | ICD-10-CM

## 2023-05-17 PROCEDURE — 76705 ECHO EXAM OF ABDOMEN: CPT | Mod: 26

## 2023-05-17 PROCEDURE — 76705 ECHO EXAM OF ABDOMEN: CPT

## 2023-05-27 ENCOUNTER — APPOINTMENT (OUTPATIENT)
Dept: MAMMOGRAPHY | Facility: IMAGING CENTER | Age: 53
End: 2023-05-27
Payer: COMMERCIAL

## 2023-05-27 ENCOUNTER — OUTPATIENT (OUTPATIENT)
Dept: OUTPATIENT SERVICES | Facility: HOSPITAL | Age: 53
LOS: 1 days | End: 2023-05-27
Payer: SELF-PAY

## 2023-05-27 ENCOUNTER — RESULT REVIEW (OUTPATIENT)
Age: 53
End: 2023-05-27

## 2023-05-27 DIAGNOSIS — Z00.00 ENCOUNTER FOR GENERAL ADULT MEDICAL EXAMINATION WITHOUT ABNORMAL FINDINGS: ICD-10-CM

## 2023-05-27 DIAGNOSIS — Z90.49 ACQUIRED ABSENCE OF OTHER SPECIFIED PARTS OF DIGESTIVE TRACT: Chronic | ICD-10-CM

## 2023-05-27 PROCEDURE — 77063 BREAST TOMOSYNTHESIS BI: CPT

## 2023-05-27 PROCEDURE — 77063 BREAST TOMOSYNTHESIS BI: CPT | Mod: 26

## 2023-05-27 PROCEDURE — 77067 SCR MAMMO BI INCL CAD: CPT | Mod: 26

## 2023-05-27 PROCEDURE — 77067 SCR MAMMO BI INCL CAD: CPT

## 2023-05-30 ENCOUNTER — NON-APPOINTMENT (OUTPATIENT)
Age: 53
End: 2023-05-30

## 2023-07-18 ENCOUNTER — EMERGENCY (EMERGENCY)
Facility: HOSPITAL | Age: 53
LOS: 1 days | Discharge: HOME | End: 2023-07-18
Attending: EMERGENCY MEDICINE
Payer: MEDICAID

## 2023-07-18 VITALS
RESPIRATION RATE: 18 BRPM | SYSTOLIC BLOOD PRESSURE: 190 MMHG | OXYGEN SATURATION: 96 % | TEMPERATURE: 98 F | HEART RATE: 64 BPM | WEIGHT: 154.98 LBS | DIASTOLIC BLOOD PRESSURE: 91 MMHG

## 2023-07-18 DIAGNOSIS — Z90.49 ACQUIRED ABSENCE OF OTHER SPECIFIED PARTS OF DIGESTIVE TRACT: Chronic | ICD-10-CM

## 2023-07-18 PROCEDURE — 99283 EMERGENCY DEPT VISIT LOW MDM: CPT

## 2023-07-18 RX ORDER — GABAPENTIN 400 MG/1
1 CAPSULE ORAL
Qty: 21 | Refills: 0
Start: 2023-07-18 | End: 2023-07-24

## 2023-07-18 NOTE — ED ADULT NURSE NOTE - OBJECTIVE STATEMENT
Pt 52 year old female, A/O x4. Pt came in due ot mouth pain in upper gum area. No PMH. As per pt, mouth pain x 2 weeks when eating spicy food. Upon assessment, pt well appearing, no deformities, bleeding, masses, noted in mouth. Denies fever, chills, n/v/d, decrease po intake, ha.

## 2023-07-18 NOTE — ED PROVIDER NOTE - ATTENDING APP SHARED VISIT CONTRIBUTION OF CARE
Attending MD David:   I personally have seen and examined this patient. I have performed a substantive portion of the visit including all aspects of the medical decision making.   Physician assistant note reviewed and agree on plan of care and except where noted.          52-year-old woman presenting for evaluation of 1 month of generalized mouth pain.  She states that it feels like she ate too much lemon or pineapple.  The discomfort is only when she is eating.  It is not present otherwise.  She cannot tell if there has been any lesions inside the mouth.  Denies any headaches or paresthesias or numbness elsewhere in the body.  Denies taking any prescription medications.    Vital signs are notable for blood pressure that is elevated 190 systolic otherwise nonactionable.  HEENT exam is notable for no intraoral ulceration or erythema.  Tongue is normal without lesions.  The posterior oropharynx within normal limits.  Tongue protrudes midline, soft palate elevates symmetrically.  Patient is sitting in the stretcher in no apparent distress.  Awake and alert. Symmetric eyebrow raise, symmetric eyelid closure. PERRL b/l, EOMI b/l, symmetric smile, tongue midline.  5/5  strength bilaterally, 5/5 elbow flexion bilaterally, 5/5 elbow extension bilaterally, 5/5 shoulder shrug b/l.  5/5 plantar and dorsiflexion b/l, 5/5 knee flexion and extension b/l, 5/5 hip flexion and extension b/l. Sensation intact and symmetric grossly to light touch throughout face and bilateral upper and lower extremities,  Steady gait.    52-year-old woman presenting for evaluation of subacute mouth pain without any mucosal findings that would explain her discomfort.  Would consider possible neuropathic reasons for pain such as burning mouth syndrome or trigeminal neuralgia.  Advised the patient that she should follow-up with neurology as an outpatient to consider nonemergent lab work.  Offered patient trial of gabapentin to see if this may help with discomfort and she accepts.  Will recommend patient follow-up with neurology for further investigation.        *The above represents an initial assessment/impression. Please refer to progress notes for potential changes in patient clinical course*

## 2023-07-18 NOTE — ED PROVIDER NOTE - PHYSICAL EXAMINATION
Gen: Patient appears stated age; in no acute distress including pain.   Psych: Awake, Alert & Oriented x 3, Cooperative.  Skin: Warm, dry. No rashes or lesions  Head: Normocephalic & Atraumatic   Eyes: no injection or icterus  ENMT: Atraumatic external nose & ears. Moist mucous membranes; no ulcers, lesions, erthythema of mouth, tongue, buccal, pharyngeal mucosa.   Resp: unlabored breathing, clear to auscultation b/l. No wheezes, rales, or rhonchi.   Cardiac: Regular rate & rhythm, s1s2 heard, no murmurs, rubs or gallops  GI: Nondistended, soft. & nontender Gen: Patient appears stated age; in no acute distress including pain.   Psych: Awake, Alert & Oriented x 3, Cooperative.  Skin: Warm, dry. No rashes or lesions  Head: Normocephalic & Atraumatic   Eyes: no injection or icterus  ENMT: Atraumatic external nose & ears. Moist mucous membranes; no ulcers, lesions, erthythema of mouth, tongue, buccal, pharyngeal mucosa. No edema, erythema, or evidence of bleeding of the gingivae.   Resp: unlabored breathing, clear to auscultation b/l. No wheezes, rales, or rhonchi.   Cardiac: Regular rate & rhythm, s1s2 heard, no murmurs, rubs or gallops  GI: Nondistended, soft. & nontender

## 2023-07-18 NOTE — ED PROVIDER NOTE - NSFOLLOWUPCLINICS_GEN_ALL_ED_FT
[facility]  Neurology Autoimmune Encephalitis  [address] [place] [id number]  Phone: [phone]  Fax: [phone]  Follow Up Time: 4-6 Days

## 2023-07-18 NOTE — ED ADULT NURSE NOTE - NSFALLUNIVINTERV_ED_ALL_ED
Bed/Stretcher in lowest position, wheels locked, appropriate side rails in place/Call bell, personal items and telephone in reach/Instruct patient to call for assistance before getting out of bed/chair/stretcher/Non-slip footwear applied when patient is off stretcher/[place] to call system/Physically safe environment - no spills, clutter or unnecessary equipment/Purposeful proactive rounding/Room/bathroom lighting operational, light cord in reach

## 2023-07-18 NOTE — ED PROVIDER NOTE - PATIENT PORTAL LINK FT
You can access the FollowMyHealth Patient Portal offered by [facility] by registering at the following website: [email or web address]. By joining [employer]’s FollowMyHealth portal, you will also be able to view your health information using other applications (apps) compatible with our system.

## 2023-07-18 NOTE — ED PROVIDER NOTE - NSFOLLOWUPINSTRUCTIONS_ED_ALL_ED_FT
1) Take Gabapentin 100 mg three times per day as prescribed. This is a 7 day supply. If necessary, the neurologist will prescribe further medication.  2) Drink plenty of fluids and avoid eating spicy/overly salty foods if possible to get enough nutrition.  3) Follow up with a neurologist for further workup within 1 week.  4) Return to the emergency department if you are unable to eat at all, experience significant bleeding from the mouth, experience one sided weakness/numbness, difficulty with speech.   5) Follow up with primary care doctor as needed

## 2023-07-18 NOTE — ED PROVIDER NOTE - OBJECTIVE STATEMENT
53 yo F w/ pmhx of HTN presents to [facility] ED for uncomfortable mouth sensations. For approximately the past month, she has noticed gradual onset of mouth pain, especially when eating. Patient reports around her lips and inside her mouth she is experiencing a dry & burning sensation. This sensation is constant throughout the day and nothing relieves said discomfort. Salty & spicy foods precipitate the pain even further. She has limited her food intake since the onset of the discomfort, but is still able to drink liquids. She has not noticed any ulcers or lesions inside her oral cavity. Denies these symptoms in the past. Denies any abdominal pain, N/V, pain in her throat, diarrhea, constipation. Denies recent illnesses, current fevers/chills, cough, runny nose. Patient brushes/flosses generally twice per day. 51 yo Malian speaking (ID# [id number]) F w/ pmhx of HTN presents to [facility] ED for uncomfortable mouth sensations. For approximately the past month, she has noticed gradual onset of mouth pain, especially when eating. Patient reports around her lips and inside her mouth she is experiencing a dry & burning sensation. This sensation is constant throughout the day and nothing relieves said discomfort. Salty & spicy foods precipitate the pain even further. She has limited her food intake since the onset of the discomfort, but is still able to drink liquids. She has not noticed any ulcers or lesions inside her oral cavity. Denies these symptoms in the past. Denies any abdominal pain, N/V, pain in her throat, diarrhea, constipation. Denies recent illnesses, current fevers/chills, cough, runny nose. Patient brushes/flosses generally twice per day.

## 2023-07-19 ENCOUNTER — NON-APPOINTMENT (OUTPATIENT)
Age: 53
End: 2023-07-19

## 2023-08-02 ENCOUNTER — APPOINTMENT (OUTPATIENT)
Dept: INTERNAL MEDICINE | Facility: CLINIC | Age: 53
End: 2023-08-02
Payer: COMMERCIAL

## 2023-08-02 ENCOUNTER — OUTPATIENT (OUTPATIENT)
Dept: OUTPATIENT SERVICES | Facility: HOSPITAL | Age: 53
LOS: 1 days | End: 2023-08-02
Payer: SELF-PAY

## 2023-08-02 VITALS
WEIGHT: 118 LBS | HEART RATE: 65 BPM | HEIGHT: 55 IN | OXYGEN SATURATION: 98 % | BODY MASS INDEX: 27.31 KG/M2 | SYSTOLIC BLOOD PRESSURE: 130 MMHG | DIASTOLIC BLOOD PRESSURE: 70 MMHG

## 2023-08-02 DIAGNOSIS — I10 ESSENTIAL (PRIMARY) HYPERTENSION: ICD-10-CM

## 2023-08-02 DIAGNOSIS — K14.6 GLOSSODYNIA: ICD-10-CM

## 2023-08-02 DIAGNOSIS — Z90.49 ACQUIRED ABSENCE OF OTHER SPECIFIED PARTS OF DIGESTIVE TRACT: Chronic | ICD-10-CM

## 2023-08-02 PROCEDURE — G0463: CPT

## 2023-08-02 PROCEDURE — ZZZZZ: CPT

## 2023-08-02 NOTE — INTERPRETER SERVICES
[Patient Declined  Services] : - None: Patient declined  services [FreeTextEntry3] : Patient declined professional  offered as physician is fluent in Ukrainian. [TWNoteComboBox1] : Belgian

## 2023-08-02 NOTE — PLAN
[FreeTextEntry1] : #Burning mouth - could be 2/2 allergic reaction to certain foods, burning mouth syndrome, neuralgia? - Gabapentin 100mg HS sent to preferred pharmacy - Lidocaine mouth wash - B12, B9, Iron, TSH today - RTC in 5 weeks for follow up; would consider then imaging H&N/ENT/Allergy referral  # HTN - repeat BP here 125/82; continue off medications - pt asymptomatic  # Nail dysthropy - referral for Podiatry reprinted - Podiatry contact # provided  Discussed w/ Dr. Murphy.

## 2023-08-02 NOTE — HISTORY OF PRESENT ILLNESS
[FreeTextEntry1] : 51F with HTN, PINO, ovarian cyst, presents for follow up burning mouth sensation and HTN.  [de-identified] : 51F with HTN, PINO, ovarian cyst, presents for follow up burning mouth sensation and HTN.   # Burning Mouth - Pt  visited ED few weeks prior for severe burning sensation in her mouth. Seen by ENT, their evaluation was unremarkable. Concerned for Burning Mouth Syndrome. She was prescribed Gabapenting but never started due to not finding [facility] pharmacy location. Pt continues to experience sxs, mostly with foods. Sxs described as burning, inside her mouth and lips, does not radiate to other parts of face. Reported lips also become very red and gums become pale but denied papules, pustules or other skin changes.  Denied other associated sxs.

## 2023-08-03 DIAGNOSIS — K14.6 GLOSSODYNIA: ICD-10-CM

## 2023-08-03 DIAGNOSIS — Z00.00 ENCOUNTER FOR GENERAL ADULT MEDICAL EXAMINATION WITHOUT ABNORMAL FINDINGS: ICD-10-CM

## 2023-08-14 ENCOUNTER — APPOINTMENT (OUTPATIENT)
Dept: PODIATRY | Facility: HOSPITAL | Age: 53
End: 2023-08-14
Payer: COMMERCIAL

## 2023-08-14 ENCOUNTER — OUTPATIENT (OUTPATIENT)
Dept: OUTPATIENT SERVICES | Facility: HOSPITAL | Age: 53
LOS: 1 days | End: 2023-08-14
Payer: SELF-PAY

## 2023-08-14 DIAGNOSIS — B35.1 TINEA UNGUIUM: ICD-10-CM

## 2023-08-14 DIAGNOSIS — M79.671 PAIN IN RIGHT FOOT: ICD-10-CM

## 2023-08-14 DIAGNOSIS — M79.609 PAIN IN UNSPECIFIED LIMB: ICD-10-CM

## 2023-08-14 DIAGNOSIS — B35.3 TINEA PEDIS: ICD-10-CM

## 2023-08-14 PROCEDURE — 99202 OFFICE O/P NEW SF 15 MIN: CPT

## 2023-08-14 PROCEDURE — G0463: CPT

## 2023-08-14 RX ORDER — CLOTRIMAZOLE 10 MG/G
1 CREAM TOPICAL 3 TIMES DAILY
Qty: 1 | Refills: 2 | Status: ACTIVE | COMMUNITY
Start: 2023-08-14 | End: 1900-01-01

## 2023-08-14 RX ORDER — CICLOPIROX 80 MG/ML
8 SOLUTION TOPICAL
Qty: 1 | Refills: 3 | Status: ACTIVE | COMMUNITY
Start: 2023-08-14 | End: 1900-01-01

## 2023-08-14 NOTE — PHYSICAL EXAM
[2+] : left foot posterior tibialis 2+ [1+] : left foot dorsalis pedis 1+ [Normal Foot/Ankle] : Both lower extremities were exposed and visualized. Standing exam demonstrates normal foot posture and alignment. Hindfoot exam shows no hindfoot valgus or varus [de-identified] : mild pain on palpation to RF hallux nail  [FreeTextEntry1] : ring scaling lesion to R foot lateral mid/hindfoot, dystrophic thickened nails to RF all digits  [Diminished Throughout Right Foot] : normal sensation with monofilament testing throughout right foot [Diminished Throughout Left Foot] : normal sensation with monofilament testing throughout left foot

## 2023-08-14 NOTE — HISTORY OF PRESENT ILLNESS
[FreeTextEntry1] : 52 F comes in under Med referral for tinea pedis and fungal nails to the R foot. Patient reports HTN hx, however not taking any medication no more. Last topical for foot fungal infection was 2 months ago, stopped since that. Denies any other medical hx.

## 2023-08-14 NOTE — ASSESSMENT
[FreeTextEntry1] : 52 year female with RF tinea pedis and onychomycosis X 5 digit RF - Patient seen and evaluated, chart and labs reviewed - RF xray ordered, rule in or out subungual exostosis, complete today  - Ciclopirox to nail daily, Clotrimazole to skin avoid interspace  BID, both rx'd - RF nail trimmed via sterile nail nipper, tolerated well.  - Proper shoe gear and socks hygine introduced , comprehended well.  - RTC 1 week

## 2023-08-18 ENCOUNTER — NON-APPOINTMENT (OUTPATIENT)
Age: 53
End: 2023-08-18

## 2023-08-21 ENCOUNTER — APPOINTMENT (OUTPATIENT)
Dept: PODIATRY | Facility: HOSPITAL | Age: 53
End: 2023-08-21
Payer: COMMERCIAL

## 2023-08-21 ENCOUNTER — RESULT REVIEW (OUTPATIENT)
Age: 53
End: 2023-08-21

## 2023-08-21 ENCOUNTER — OUTPATIENT (OUTPATIENT)
Dept: OUTPATIENT SERVICES | Facility: HOSPITAL | Age: 53
LOS: 1 days | End: 2023-08-21
Payer: SELF-PAY

## 2023-08-21 VITALS
HEIGHT: 55 IN | HEART RATE: 51 BPM | WEIGHT: 118 LBS | DIASTOLIC BLOOD PRESSURE: 83 MMHG | BODY MASS INDEX: 27.31 KG/M2 | RESPIRATION RATE: 14 BRPM | SYSTOLIC BLOOD PRESSURE: 130 MMHG | TEMPERATURE: 98 F

## 2023-08-21 DIAGNOSIS — M79.609 PAIN IN UNSPECIFIED LIMB: ICD-10-CM

## 2023-08-21 DIAGNOSIS — B35.1 TINEA UNGUIUM: ICD-10-CM

## 2023-08-21 DIAGNOSIS — B35.3 TINEA PEDIS: ICD-10-CM

## 2023-08-21 DIAGNOSIS — Z90.49 ACQUIRED ABSENCE OF OTHER SPECIFIED PARTS OF DIGESTIVE TRACT: Chronic | ICD-10-CM

## 2023-08-21 PROCEDURE — 99212 OFFICE O/P EST SF 10 MIN: CPT

## 2023-08-21 PROCEDURE — 73630 X-RAY EXAM OF FOOT: CPT

## 2023-08-21 PROCEDURE — G0463: CPT

## 2023-08-21 PROCEDURE — 73630 X-RAY EXAM OF FOOT: CPT | Mod: 26,RT

## 2023-08-21 NOTE — ASSESSMENT
[FreeTextEntry1] : 52 year female with RF tinea pedis  - Patient seen and evaluated with attending  - RF xray negative for any subungual exostosis. -  Patient instructed to continue with Ciclopirox to nail daily, and Clotrimazole to skin but avoiding interspaces - Proper shoe gear and socks hygiene introduced, comprehended well.  - RTC 3 months

## 2023-08-21 NOTE — PHYSICAL EXAM
[2+] : left foot posterior tibialis 2+ [1+] : left foot dorsalis pedis 1+ [Normal Foot/Ankle] : Both lower extremities were exposed and visualized. Standing exam demonstrates normal foot posture and alignment. Hindfoot exam shows no hindfoot valgus or varus [de-identified] : mild pain on palpation to RF hallux nail  [FreeTextEntry1] : ring scaling lesion to R foot lateral mid/hindfoot, dystrophic thickened nails to RF all digits  [Diminished Throughout Right Foot] : normal sensation with monofilament testing throughout right foot [Diminished Throughout Left Foot] : normal sensation with monofilament testing throughout left foot

## 2023-08-21 NOTE — HISTORY OF PRESENT ILLNESS
[FreeTextEntry1] : 52 F comes in for follow up for tinea pedis and fungal nails to the R foot. Patient reports that she started with ciclopirox for one week. Patient also had Xray done prior to presenting to clinic to rule out any subungual exostosis. Pt denies any fever, nausea, vomiting, SOB.

## 2023-09-08 ENCOUNTER — OUTPATIENT (OUTPATIENT)
Dept: OUTPATIENT SERVICES | Facility: HOSPITAL | Age: 53
LOS: 1 days | End: 2023-09-08

## 2023-09-08 ENCOUNTER — APPOINTMENT (OUTPATIENT)
Dept: INTERNAL MEDICINE | Facility: CLINIC | Age: 53
End: 2023-09-08
Payer: COMMERCIAL

## 2023-09-08 VITALS
WEIGHT: 118 LBS | BODY MASS INDEX: 27.31 KG/M2 | DIASTOLIC BLOOD PRESSURE: 86 MMHG | HEIGHT: 55 IN | OXYGEN SATURATION: 98 % | HEART RATE: 75 BPM | SYSTOLIC BLOOD PRESSURE: 122 MMHG

## 2023-09-08 DIAGNOSIS — Z90.49 ACQUIRED ABSENCE OF OTHER SPECIFIED PARTS OF DIGESTIVE TRACT: Chronic | ICD-10-CM

## 2023-09-08 DIAGNOSIS — I10 ESSENTIAL (PRIMARY) HYPERTENSION: ICD-10-CM

## 2023-09-08 DIAGNOSIS — J02.9 ACUTE PHARYNGITIS, UNSPECIFIED: ICD-10-CM

## 2023-09-08 PROCEDURE — 99213 OFFICE O/P EST LOW 20 MIN: CPT | Mod: GE

## 2023-09-08 NOTE — REVIEW OF SYSTEMS
[Fever] : fever [Sore Throat] : sore throat [Negative] : Heme/Lymph [Earache] : no earache [Hoarseness] : no hoarseness [Nasal Discharge] : no nasal discharge [Postnasal Drip] : no postnasal drip

## 2023-09-08 NOTE — PLAN
[FreeTextEntry1] : # Burning Mouth Syndrome - now resolved  # Viral Illness - pt exposed to sick contact 2 weeks prior to having sorethroat and later fever and bodyaches - now symptoms improving; low likelihood for bacterial infection - small, tender swollen gland in L neck; likely reactive from viral infection - continue w/ supportive measures w/ Tylenol/Aleve  #HCM - pt due for 2nd Shingles vaccine; will defer today; pt aware to get it once symptoms fully resolved.  RTC PRN.

## 2023-09-08 NOTE — INTERPRETER SERVICES
[Patient Declined  Services] : - None: Patient declined  services [FreeTextEntry3] : Patient declined professional  offered as physician is fluent in Czech. [TWNoteComboBox1] : Irish

## 2023-09-08 NOTE — HISTORY OF PRESENT ILLNESS
[FreeTextEntry1] : 52F with HTN, PINO, ovarian cyst, presents for follow up burning mouth sensation and HTN. [de-identified] : 52F with HTN, PINO, ovarian cyst, presents for follow up burning mouth sensation and HTN. #Burning Mouth Syndrome - now resolved; pt never started gabapentin  # Fevers/Myalgias Patient reported 3 days prior to visit she spiked a fever (not quantified) associated to body aches and chills. She took Tylenol and felt better; at night she still felt feverish. Next day fever was resolved, and the body aches started to improve. Today the only symptom present is mild left sided throat soreness. Denied cough, nausea, vomit, diarrhea, urinary symptoms, congestion on rhinorrhea. Was exposed to a child who was ill and coughing 2 weeks prior and had a sore throat for a few days before onset of fever.

## 2023-10-02 PROBLEM — Z00.00 ENCOUNTER FOR PREVENTIVE HEALTH EXAMINATION: Status: ACTIVE | Noted: 2023-10-02

## 2023-10-04 ENCOUNTER — APPOINTMENT (OUTPATIENT)
Dept: DERMATOLOGY | Facility: CLINIC | Age: 53
End: 2023-10-04

## 2023-11-06 ENCOUNTER — APPOINTMENT (OUTPATIENT)
Dept: PODIATRY | Facility: HOSPITAL | Age: 53
End: 2023-11-06
Payer: COMMERCIAL

## 2023-11-06 ENCOUNTER — OUTPATIENT (OUTPATIENT)
Dept: OUTPATIENT SERVICES | Facility: HOSPITAL | Age: 53
LOS: 1 days | End: 2023-11-06
Payer: SELF-PAY

## 2023-11-06 VITALS
DIASTOLIC BLOOD PRESSURE: 83 MMHG | BODY MASS INDEX: 27.31 KG/M2 | RESPIRATION RATE: 14 BRPM | WEIGHT: 118 LBS | SYSTOLIC BLOOD PRESSURE: 166 MMHG | TEMPERATURE: 98 F | HEIGHT: 55 IN | HEART RATE: 63 BPM

## 2023-11-06 DIAGNOSIS — B35.1 TINEA UNGUIUM: ICD-10-CM

## 2023-11-06 DIAGNOSIS — Z90.49 ACQUIRED ABSENCE OF OTHER SPECIFIED PARTS OF DIGESTIVE TRACT: Chronic | ICD-10-CM

## 2023-11-06 DIAGNOSIS — M79.609 PAIN IN UNSPECIFIED LIMB: ICD-10-CM

## 2023-11-06 PROCEDURE — G0463: CPT

## 2023-11-06 PROCEDURE — 99212 OFFICE O/P EST SF 10 MIN: CPT

## 2023-11-06 RX ORDER — LIDOCAINE HYDROCHLORIDE 40 MG/ML
4 SOLUTION TOPICAL
Qty: 1 | Refills: 0 | Status: DISCONTINUED | COMMUNITY
Start: 2023-08-02 | End: 2023-11-06

## 2023-11-10 ENCOUNTER — OUTPATIENT (OUTPATIENT)
Dept: OUTPATIENT SERVICES | Facility: HOSPITAL | Age: 53
LOS: 1 days | End: 2023-11-10
Payer: SELF-PAY

## 2023-11-10 DIAGNOSIS — B35.1 TINEA UNGUIUM: ICD-10-CM

## 2023-11-10 DIAGNOSIS — Z90.49 ACQUIRED ABSENCE OF OTHER SPECIFIED PARTS OF DIGESTIVE TRACT: Chronic | ICD-10-CM

## 2023-11-10 LAB
ALT FLD-CCNC: 23 U/L — SIGNIFICANT CHANGE UP (ref 10–45)
ALT FLD-CCNC: 23 U/L — SIGNIFICANT CHANGE UP (ref 10–45)
AST SERPL-CCNC: 21 U/L — SIGNIFICANT CHANGE UP (ref 10–40)
AST SERPL-CCNC: 21 U/L — SIGNIFICANT CHANGE UP (ref 10–40)
BASOPHILS # BLD AUTO: 0.08 K/UL — SIGNIFICANT CHANGE UP (ref 0–0.2)
BASOPHILS # BLD AUTO: 0.08 K/UL — SIGNIFICANT CHANGE UP (ref 0–0.2)
BASOPHILS NFR BLD AUTO: 1.4 % — SIGNIFICANT CHANGE UP (ref 0–2)
BASOPHILS NFR BLD AUTO: 1.4 % — SIGNIFICANT CHANGE UP (ref 0–2)
EOSINOPHIL # BLD AUTO: 0.54 K/UL — HIGH (ref 0–0.5)
EOSINOPHIL # BLD AUTO: 0.54 K/UL — HIGH (ref 0–0.5)
EOSINOPHIL NFR BLD AUTO: 9.4 % — HIGH (ref 0–6)
EOSINOPHIL NFR BLD AUTO: 9.4 % — HIGH (ref 0–6)
HCT VFR BLD CALC: 40.8 % — SIGNIFICANT CHANGE UP (ref 34.5–45)
HCT VFR BLD CALC: 40.8 % — SIGNIFICANT CHANGE UP (ref 34.5–45)
HGB BLD-MCNC: 13.6 G/DL — SIGNIFICANT CHANGE UP (ref 11.5–15.5)
HGB BLD-MCNC: 13.6 G/DL — SIGNIFICANT CHANGE UP (ref 11.5–15.5)
IMM GRANULOCYTES NFR BLD AUTO: 0.2 % — SIGNIFICANT CHANGE UP (ref 0–0.9)
IMM GRANULOCYTES NFR BLD AUTO: 0.2 % — SIGNIFICANT CHANGE UP (ref 0–0.9)
LYMPHOCYTES # BLD AUTO: 2.55 K/UL — SIGNIFICANT CHANGE UP (ref 1–3.3)
LYMPHOCYTES # BLD AUTO: 2.55 K/UL — SIGNIFICANT CHANGE UP (ref 1–3.3)
LYMPHOCYTES # BLD AUTO: 44.2 % — HIGH (ref 13–44)
LYMPHOCYTES # BLD AUTO: 44.2 % — HIGH (ref 13–44)
MCHC RBC-ENTMCNC: 30.1 PG — SIGNIFICANT CHANGE UP (ref 27–34)
MCHC RBC-ENTMCNC: 30.1 PG — SIGNIFICANT CHANGE UP (ref 27–34)
MCHC RBC-ENTMCNC: 33.3 GM/DL — SIGNIFICANT CHANGE UP (ref 32–36)
MCHC RBC-ENTMCNC: 33.3 GM/DL — SIGNIFICANT CHANGE UP (ref 32–36)
MCV RBC AUTO: 90.3 FL — SIGNIFICANT CHANGE UP (ref 80–100)
MCV RBC AUTO: 90.3 FL — SIGNIFICANT CHANGE UP (ref 80–100)
MONOCYTES # BLD AUTO: 0.44 K/UL — SIGNIFICANT CHANGE UP (ref 0–0.9)
MONOCYTES # BLD AUTO: 0.44 K/UL — SIGNIFICANT CHANGE UP (ref 0–0.9)
MONOCYTES NFR BLD AUTO: 7.6 % — SIGNIFICANT CHANGE UP (ref 2–14)
MONOCYTES NFR BLD AUTO: 7.6 % — SIGNIFICANT CHANGE UP (ref 2–14)
NEUTROPHILS # BLD AUTO: 2.15 K/UL — SIGNIFICANT CHANGE UP (ref 1.8–7.4)
NEUTROPHILS # BLD AUTO: 2.15 K/UL — SIGNIFICANT CHANGE UP (ref 1.8–7.4)
NEUTROPHILS NFR BLD AUTO: 37.2 % — LOW (ref 43–77)
NEUTROPHILS NFR BLD AUTO: 37.2 % — LOW (ref 43–77)
PLATELET # BLD AUTO: 366 K/UL — SIGNIFICANT CHANGE UP (ref 150–400)
PLATELET # BLD AUTO: 366 K/UL — SIGNIFICANT CHANGE UP (ref 150–400)
RBC # BLD: 4.52 M/UL — SIGNIFICANT CHANGE UP (ref 3.8–5.2)
RBC # BLD: 4.52 M/UL — SIGNIFICANT CHANGE UP (ref 3.8–5.2)
RBC # FLD: 12.6 % — SIGNIFICANT CHANGE UP (ref 10.3–14.5)
RBC # FLD: 12.6 % — SIGNIFICANT CHANGE UP (ref 10.3–14.5)
WBC # BLD: 5.77 K/UL — SIGNIFICANT CHANGE UP (ref 3.8–10.5)
WBC # BLD: 5.77 K/UL — SIGNIFICANT CHANGE UP (ref 3.8–10.5)
WBC # FLD AUTO: 5.77 K/UL — SIGNIFICANT CHANGE UP (ref 3.8–10.5)
WBC # FLD AUTO: 5.77 K/UL — SIGNIFICANT CHANGE UP (ref 3.8–10.5)

## 2023-11-10 PROCEDURE — 36415 COLL VENOUS BLD VENIPUNCTURE: CPT

## 2023-11-10 PROCEDURE — 85025 COMPLETE CBC W/AUTO DIFF WBC: CPT

## 2023-11-10 PROCEDURE — 84450 TRANSFERASE (AST) (SGOT): CPT

## 2023-11-10 PROCEDURE — 84460 ALANINE AMINO (ALT) (SGPT): CPT

## 2023-11-15 ENCOUNTER — APPOINTMENT (OUTPATIENT)
Dept: PODIATRY | Facility: HOSPITAL | Age: 53
End: 2023-11-15
Payer: SELF-PAY

## 2023-11-15 ENCOUNTER — OUTPATIENT (OUTPATIENT)
Dept: OUTPATIENT SERVICES | Facility: HOSPITAL | Age: 53
LOS: 1 days | End: 2023-11-15
Payer: SELF-PAY

## 2023-11-15 VITALS — DIASTOLIC BLOOD PRESSURE: 84 MMHG | SYSTOLIC BLOOD PRESSURE: 143 MMHG | TEMPERATURE: 96.1 F | HEART RATE: 61 BPM

## 2023-11-15 DIAGNOSIS — B35.3 TINEA PEDIS: ICD-10-CM

## 2023-11-15 DIAGNOSIS — B35.1 TINEA UNGUIUM: ICD-10-CM

## 2023-11-15 DIAGNOSIS — Z90.49 ACQUIRED ABSENCE OF OTHER SPECIFIED PARTS OF DIGESTIVE TRACT: Chronic | ICD-10-CM

## 2023-11-15 DIAGNOSIS — M79.609 PAIN IN UNSPECIFIED LIMB: ICD-10-CM

## 2023-11-15 PROCEDURE — 84450 TRANSFERASE (AST) (SGOT): CPT

## 2023-11-15 PROCEDURE — 84460 ALANINE AMINO (ALT) (SGPT): CPT

## 2023-11-15 PROCEDURE — 36415 COLL VENOUS BLD VENIPUNCTURE: CPT

## 2023-11-15 PROCEDURE — 99213 OFFICE O/P EST LOW 20 MIN: CPT

## 2023-11-15 PROCEDURE — G0463: CPT

## 2023-11-15 RX ORDER — CICLOPIROX 80 MG/ML
8 SOLUTION TOPICAL
Qty: 1 | Refills: 4 | Status: ACTIVE | COMMUNITY
Start: 2023-11-15 | End: 1900-01-01

## 2023-11-15 RX ORDER — TERBINAFINE HYDROCHLORIDE 250 MG/1
250 TABLET ORAL DAILY
Qty: 30 | Refills: 0 | Status: ACTIVE | COMMUNITY
Start: 2023-11-15 | End: 1900-01-01

## 2023-11-16 DIAGNOSIS — B35.1 TINEA UNGUIUM: ICD-10-CM

## 2023-11-16 DIAGNOSIS — B35.3 TINEA PEDIS: ICD-10-CM

## 2023-12-12 ENCOUNTER — APPOINTMENT (OUTPATIENT)
Dept: NEUROLOGY | Facility: HOSPITAL | Age: 53
End: 2023-12-12

## 2023-12-12 ENCOUNTER — OUTPATIENT (OUTPATIENT)
Dept: OUTPATIENT SERVICES | Facility: HOSPITAL | Age: 53
LOS: 1 days | End: 2023-12-12
Payer: SELF-PAY

## 2023-12-12 VITALS
TEMPERATURE: 97.8 F | DIASTOLIC BLOOD PRESSURE: 95 MMHG | BODY MASS INDEX: 27.77 KG/M2 | HEART RATE: 63 BPM | SYSTOLIC BLOOD PRESSURE: 165 MMHG | RESPIRATION RATE: 14 BRPM | WEIGHT: 120 LBS | HEIGHT: 55 IN

## 2023-12-12 DIAGNOSIS — Z90.49 ACQUIRED ABSENCE OF OTHER SPECIFIED PARTS OF DIGESTIVE TRACT: Chronic | ICD-10-CM

## 2023-12-12 DIAGNOSIS — R51.9 HEADACHE, UNSPECIFIED: ICD-10-CM

## 2023-12-12 DIAGNOSIS — K13.79 OTHER LESIONS OF ORAL MUCOSA: ICD-10-CM

## 2023-12-12 PROCEDURE — G0463: CPT

## 2023-12-12 RX ORDER — FAMOTIDINE 20 MG/1
20 TABLET, FILM COATED ORAL DAILY
Qty: 30 | Refills: 0 | Status: DISCONTINUED | COMMUNITY
Start: 2023-09-08 | End: 2023-12-12

## 2023-12-12 RX ORDER — MENTHOL 7.6 MG/1
6.1 LOZENGE ORAL
Qty: 30 | Refills: 0 | Status: DISCONTINUED | COMMUNITY
Start: 2023-09-08 | End: 2023-12-12

## 2023-12-12 RX ORDER — GABAPENTIN 100 MG/1
100 CAPSULE ORAL 3 TIMES DAILY
Qty: 90 | Refills: 3 | Status: ACTIVE | COMMUNITY
Start: 2023-12-12 | End: 1900-01-01

## 2024-01-08 ENCOUNTER — APPOINTMENT (OUTPATIENT)
Dept: PODIATRY | Facility: HOSPITAL | Age: 54
End: 2024-01-08

## 2024-01-24 ENCOUNTER — APPOINTMENT (OUTPATIENT)
Dept: INTERNAL MEDICINE | Facility: CLINIC | Age: 54
End: 2024-01-24
Payer: COMMERCIAL

## 2024-01-24 ENCOUNTER — OUTPATIENT (OUTPATIENT)
Dept: OUTPATIENT SERVICES | Facility: HOSPITAL | Age: 54
LOS: 1 days | End: 2024-01-24
Payer: SELF-PAY

## 2024-01-24 ENCOUNTER — RESULT REVIEW (OUTPATIENT)
Age: 54
End: 2024-01-24

## 2024-01-24 VITALS
HEART RATE: 68 BPM | OXYGEN SATURATION: 98 % | DIASTOLIC BLOOD PRESSURE: 80 MMHG | HEIGHT: 55 IN | SYSTOLIC BLOOD PRESSURE: 138 MMHG | BODY MASS INDEX: 28 KG/M2 | WEIGHT: 121 LBS

## 2024-01-24 DIAGNOSIS — K21.9 GASTRO-ESOPHAGEAL REFLUX DISEASE W/OUT ESOPHAGITIS: ICD-10-CM

## 2024-01-24 DIAGNOSIS — I10 ESSENTIAL (PRIMARY) HYPERTENSION: ICD-10-CM

## 2024-01-24 DIAGNOSIS — Z90.49 ACQUIRED ABSENCE OF OTHER SPECIFIED PARTS OF DIGESTIVE TRACT: Chronic | ICD-10-CM

## 2024-01-24 PROCEDURE — 99213 OFFICE O/P EST LOW 20 MIN: CPT | Mod: GC

## 2024-01-24 PROCEDURE — G0463: CPT

## 2024-01-24 PROCEDURE — T1013: CPT

## 2024-01-24 PROCEDURE — 85025 COMPLETE CBC W/AUTO DIFF WBC: CPT

## 2024-01-24 PROCEDURE — 80053 COMPREHEN METABOLIC PANEL: CPT

## 2024-01-24 PROCEDURE — 84702 CHORIONIC GONADOTROPIN TEST: CPT

## 2024-01-24 RX ORDER — PANTOPRAZOLE 40 MG/1
40 TABLET, DELAYED RELEASE ORAL
Qty: 90 | Refills: 1 | Status: ACTIVE | COMMUNITY
Start: 2024-01-24 | End: 1900-01-01

## 2024-01-25 LAB
ALBUMIN SERPL ELPH-MCNC: 4.6 G/DL
ALP BLD-CCNC: 166 U/L
ALT SERPL-CCNC: 22 U/L
ANION GAP SERPL CALC-SCNC: 11 MMOL/L
AST SERPL-CCNC: 25 U/L
BASOPHILS # BLD AUTO: 0.03 K/UL
BASOPHILS NFR BLD AUTO: 0.4 %
BILIRUB SERPL-MCNC: 0.2 MG/DL
BUN SERPL-MCNC: 11 MG/DL
CALCIUM SERPL-MCNC: 9.5 MG/DL
CHLORIDE SERPL-SCNC: 104 MMOL/L
CO2 SERPL-SCNC: 27 MMOL/L
CREAT SERPL-MCNC: 0.71 MG/DL
EGFR: 102 ML/MIN/1.73M2
EOSINOPHIL # BLD AUTO: 0.44 K/UL
EOSINOPHIL NFR BLD AUTO: 5.2 %
GLUCOSE SERPL-MCNC: 103 MG/DL
HCG SERPL-MCNC: 2 MIU/ML
HCT VFR BLD CALC: 42 %
HGB BLD-MCNC: 13.8 G/DL
IMM GRANULOCYTES NFR BLD AUTO: 0.5 %
LYMPHOCYTES # BLD AUTO: 2.39 K/UL
LYMPHOCYTES NFR BLD AUTO: 28.1 %
MAN DIFF?: NORMAL
MCHC RBC-ENTMCNC: 29.7 PG
MCHC RBC-ENTMCNC: 32.9 GM/DL
MCV RBC AUTO: 90.3 FL
MONOCYTES # BLD AUTO: 0.62 K/UL
MONOCYTES NFR BLD AUTO: 7.3 %
NEUTROPHILS # BLD AUTO: 5 K/UL
NEUTROPHILS NFR BLD AUTO: 58.5 %
PLATELET # BLD AUTO: 357 K/UL
POTASSIUM SERPL-SCNC: 4.2 MMOL/L
PROT SERPL-MCNC: 7.5 G/DL
RBC # BLD: 4.65 M/UL
RBC # FLD: 12.7 %
SODIUM SERPL-SCNC: 142 MMOL/L
WBC # FLD AUTO: 8.52 K/UL

## 2024-01-27 ENCOUNTER — OUTPATIENT (OUTPATIENT)
Dept: OUTPATIENT SERVICES | Facility: HOSPITAL | Age: 54
LOS: 1 days | End: 2024-01-27
Payer: SELF-PAY

## 2024-01-27 ENCOUNTER — APPOINTMENT (OUTPATIENT)
Dept: ULTRASOUND IMAGING | Facility: IMAGING CENTER | Age: 54
End: 2024-01-27
Payer: COMMERCIAL

## 2024-01-27 DIAGNOSIS — R10.30 LOWER ABDOMINAL PAIN, UNSPECIFIED: ICD-10-CM

## 2024-01-27 DIAGNOSIS — Z90.49 ACQUIRED ABSENCE OF OTHER SPECIFIED PARTS OF DIGESTIVE TRACT: Chronic | ICD-10-CM

## 2024-01-27 PROCEDURE — 76700 US EXAM ABDOM COMPLETE: CPT | Mod: 26

## 2024-01-27 PROCEDURE — 76856 US EXAM PELVIC COMPLETE: CPT

## 2024-01-27 PROCEDURE — 76856 US EXAM PELVIC COMPLETE: CPT | Mod: 26

## 2024-01-27 PROCEDURE — 76700 US EXAM ABDOM COMPLETE: CPT

## 2024-01-29 PROBLEM — K21.9 GASTROESOPHAGEAL REFLUX DISEASE WITHOUT ESOPHAGITIS: Status: ACTIVE | Noted: 2023-09-08

## 2024-01-29 NOTE — HISTORY OF PRESENT ILLNESS
[FreeTextEntry8] : 53 YOF with HTN, PINO, pre-DM, L ovarian cysts presenting for abdominal pain. She reports that at the end of December she had fever, body aches, and loss of appetite. Was unable to have RVP done. This was associated with lower abdominal pain. All the discomfort is now gone but now she is complaining of epigastric burning, and 'needles poking' in her lower abdomen. She points back and forth along the lower abdomen when asked to show where her pain is the worst. Her epigastric burning is worsened by eating and she develops gas and burping. Worse when laying down. Pt was given famotidine 3 months prior for less severe symptoms. She tried taking the famotidine now but it has not helped. Pt denies SOB or SALGADO. No chest pain. No melena or hematochezia. No diarrhea. Denies pregnancy. Has not tried tylenol or motrin.  #Meds - none

## 2024-01-29 NOTE — REVIEW OF SYSTEMS
[Fever] : no fever [Chills] : no chills [Fatigue] : no fatigue [Chest Pain] : no chest pain [Palpitations] : no palpitations [Lower Ext Edema] : no lower extremity edema [Orthopnea] : no orthopnea [Paroxysmal Nocturnal Dyspnea] : no paroxysmal nocturnal dyspnea [Shortness Of Breath] : no shortness of breath [Wheezing] : no wheezing [Cough] : no cough [Dyspnea on Exertion] : no dyspnea on exertion [Nausea] : no nausea [Constipation] : no constipation [Diarrhea] : diarrhea [Vomiting] : no vomiting [Melena] : no melena [Dysuria] : no dysuria [Joint Pain] : no joint pain [Joint Stiffness] : no joint stiffness [Skin Rash] : no skin rash [FreeTextEntry7] : Per HPI

## 2024-01-29 NOTE — PLAN
[FreeTextEntry1] : #Lower abdominal pain - Unclear etiology of the patient's lower abdominal pain and unclear if related to preceding viral illness. Differential diagnosis included ruptured ovarian cyst (though timing is prolonged), diverticulitis (though colonoscopy in 2021 did not show diverticula), colitis (though location of symptoms is atypical, and pt not having diarrhea). Lower concern for ischemia colitis.  - Pt did not have fibroids in 2021 TVUS - Pt had normal RUQ us in May 2023 - Will check CBC, CMP, hcg - Will check US abdomen and pelvis, may need CT if negative  #Epigastric bloating - Trial of pantoprazole 40mg daily  - RTC 2 weeks for follow up

## 2024-01-29 NOTE — END OF VISIT
[] : Resident [FreeTextEntry3] : Labs and imaging as above. ED advised if any red flags - worrisome signs/sx reviewed.  Further assessment/treatment based on interval rsults.

## 2024-01-29 NOTE — PHYSICAL EXAM
[de-identified] : Abdomen is soft, non-distended, with normal bowel sounds x4. NON-tender in the RUQ, LUQ, and epigastrium. Pt is very tender in the RLQ and LLQ, moreso in the LLQ. Abdomen is SOFT.
Risks/benefits discussed with patient or patient surrogate

## 2024-02-02 ENCOUNTER — NON-APPOINTMENT (OUTPATIENT)
Age: 54
End: 2024-02-02

## 2024-02-05 DIAGNOSIS — K21.9 GASTRO-ESOPHAGEAL REFLUX DISEASE WITHOUT ESOPHAGITIS: ICD-10-CM

## 2024-02-05 DIAGNOSIS — R10.30 LOWER ABDOMINAL PAIN, UNSPECIFIED: ICD-10-CM

## 2024-02-09 ENCOUNTER — APPOINTMENT (OUTPATIENT)
Dept: INTERNAL MEDICINE | Facility: CLINIC | Age: 54
End: 2024-02-09
Payer: COMMERCIAL

## 2024-02-09 ENCOUNTER — OUTPATIENT (OUTPATIENT)
Dept: OUTPATIENT SERVICES | Facility: HOSPITAL | Age: 54
LOS: 1 days | End: 2024-02-09
Payer: SELF-PAY

## 2024-02-09 VITALS
HEART RATE: 57 BPM | SYSTOLIC BLOOD PRESSURE: 132 MMHG | OXYGEN SATURATION: 98 % | DIASTOLIC BLOOD PRESSURE: 78 MMHG | HEIGHT: 55 IN | BODY MASS INDEX: 26.61 KG/M2 | WEIGHT: 115 LBS

## 2024-02-09 DIAGNOSIS — I10 ESSENTIAL (PRIMARY) HYPERTENSION: ICD-10-CM

## 2024-02-09 DIAGNOSIS — N83.202 UNSPECIFIED OVARIAN CYST, LEFT SIDE: ICD-10-CM

## 2024-02-09 DIAGNOSIS — R10.30 LOWER ABDOMINAL PAIN, UNSPECIFIED: ICD-10-CM

## 2024-02-09 PROCEDURE — 99213 OFFICE O/P EST LOW 20 MIN: CPT | Mod: GE

## 2024-02-09 PROCEDURE — G0463: CPT

## 2024-02-09 RX ORDER — ACETAMINOPHEN 500 MG/1
500 TABLET ORAL 4 TIMES DAILY
Qty: 160 | Refills: 0 | Status: ACTIVE | COMMUNITY
Start: 2024-02-09 | End: 1900-01-01

## 2024-02-09 RX ORDER — MELOXICAM 7.5 MG/1
7.5 TABLET ORAL DAILY
Qty: 10 | Refills: 0 | Status: ACTIVE | COMMUNITY
Start: 2024-02-09 | End: 1900-01-01

## 2024-02-21 DIAGNOSIS — R10.30 LOWER ABDOMINAL PAIN, UNSPECIFIED: ICD-10-CM

## 2024-02-21 DIAGNOSIS — N83.202 UNSPECIFIED OVARIAN CYST, LEFT SIDE: ICD-10-CM

## 2024-03-15 ENCOUNTER — RESULT REVIEW (OUTPATIENT)
Age: 54
End: 2024-03-15

## 2024-03-15 ENCOUNTER — APPOINTMENT (OUTPATIENT)
Dept: OBGYN | Facility: CLINIC | Age: 54
End: 2024-03-15
Payer: COMMERCIAL

## 2024-03-15 ENCOUNTER — LABORATORY RESULT (OUTPATIENT)
Age: 54
End: 2024-03-15

## 2024-03-15 ENCOUNTER — OUTPATIENT (OUTPATIENT)
Dept: OUTPATIENT SERVICES | Facility: HOSPITAL | Age: 54
LOS: 1 days | End: 2024-03-15
Payer: SELF-PAY

## 2024-03-15 VITALS — DIASTOLIC BLOOD PRESSURE: 84 MMHG | WEIGHT: 118 LBS | BODY MASS INDEX: 27.43 KG/M2 | SYSTOLIC BLOOD PRESSURE: 130 MMHG

## 2024-03-15 DIAGNOSIS — Z01.419 ENCOUNTER FOR GYNECOLOGICAL EXAMINATION (GENERAL) (ROUTINE) W/OUT ABNORMAL FINDINGS: ICD-10-CM

## 2024-03-15 DIAGNOSIS — Z90.49 ACQUIRED ABSENCE OF OTHER SPECIFIED PARTS OF DIGESTIVE TRACT: Chronic | ICD-10-CM

## 2024-03-15 DIAGNOSIS — N76.0 ACUTE VAGINITIS: ICD-10-CM

## 2024-03-15 DIAGNOSIS — N95.2 POSTMENOPAUSAL ATROPHIC VAGINITIS: ICD-10-CM

## 2024-03-15 DIAGNOSIS — N83.202 UNSPECIFIED OVARIAN CYST, LEFT SIDE: ICD-10-CM

## 2024-03-15 PROCEDURE — G0463: CPT

## 2024-03-15 PROCEDURE — G0444 DEPRESSION SCREEN ANNUAL: CPT | Mod: 59

## 2024-03-15 PROCEDURE — 87624 HPV HI-RISK TYP POOLED RSLT: CPT

## 2024-03-15 PROCEDURE — T1013: CPT

## 2024-03-15 PROCEDURE — 99386 PREV VISIT NEW AGE 40-64: CPT

## 2024-03-15 PROCEDURE — 88175 CYTOPATH C/V AUTO FLUID REDO: CPT

## 2024-03-15 RX ORDER — ESTRADIOL 0.1 MG/G
0.1 CREAM VAGINAL
Qty: 1 | Refills: 4 | Status: ACTIVE | COMMUNITY
Start: 2024-03-15 | End: 1900-01-01

## 2024-03-15 NOTE — REASON FOR VISIT
[[employer] ] : provided by [employer]   [Annual] : an annual visit. [Time Spent: ____ minutes] : Total time spent using  services: [unfilled] minutes. The patient's primary language is not English thus required  services. [Interpreters_IDNumber] : [id number] [TWNoteComboBox1] : Gabonese

## 2024-03-15 NOTE — PHYSICAL EXAM
[Appropriately responsive] : appropriately responsive [Alert] : alert [No Lymphadenopathy] : no lymphadenopathy [No Acute Distress] : no acute distress [Soft] : soft [Non-distended] : non-distended [Non-tender] : non-tender [No Lesions] : no lesions [No HSM] : No HSM [Oriented x3] : oriented x3 [No Mass] : no mass [Examination Of The Breasts] : a normal appearance [No Masses] : no breast masses were palpable [Labia Majora] : normal [Vulvar Atrophy] : vulvar atrophy [Labia Minora] : normal [Atrophy] : atrophy [No Bleeding] : There was no active vaginal bleeding [Tenderness] : nontender [Normal] : normal [Uterine Adnexae] : normal [FreeTextEntry5] : stenotic os

## 2024-03-15 NOTE — DISCUSSION/SUMMARY
[FreeTextEntry1] : 52yo P4- PM annual exam/ re-esablishing care - [ ]pap/ hpv - [ ]mammo - colonoscopy up to date - Vag atrophy:  estrace rx - pelvic pain (intermit)/ PM OV cyst on 1/24 sono- [ ]short term f/u for stability, pelvic sono rx.  Pain unlikely related to simple cyst, recommend f/u with GI - Depression screen:  reviewed PHQ9 (10min face to face)- no sign of clinical depression,  will f/u for changes in mood/ anxiety. - gen health followed by med  Will call with sono result RTC for danny/farhat Tyson, PAC

## 2024-03-15 NOTE — HISTORY OF PRESENT ILLNESS
[FreeTextEntry1] : 52yo P4 (LMP 43yo ) with h/o preDm/HTN/PINO/neuropath pain presents to reestablish care/annual exam.  Pt reports intermittent LLQ pain since January.  PCP sent pt for pelvic sono- small LOV simple cyst noted.  Pt denies PMB/ change in bowel or bladder habits.  +vag dryness.  Sexually active/ monogamous.    - PAP 4/2021 neg/ hpv neg - Mammo- 5/2023 Birads 1 - Colonoscopy- 2021 - Depression screen PHQ9: 2  Gen health followed by medicine  	 EXAM: [id number] - US PELVIC COMPLETE  - ORDERED BY: JESUS TERRY PROCEDURE DATE:  01/27/2024 INTERPRETATION:  CLINICAL INFORMATION: Pelvic pain LMP: Postmenopausal COMPARISON: Pelvic ultrasound 4/22/2021 TECHNIQUE: Endovaginal and transabdominal pelvic sonogram. Endovaginal exam was performed to better evaluate the endometrium and ovaries. FINDINGS: Uterus: 5.7 cm x 2.3 cm x 3.7 cm. Within normal limits. Endometrium: 5 mm. Within normal limits. Right ovary: 2.7 cm x 1.1 cm x 2.1 cm. Within normal limits. Left ovary: 3.7 cm x 2.1 cm x 3.2 cm. simple cysts measuring 2.2 cm and 2.4 cm, previously 1.8 cm Fluid: None. IMPRESSION: Slight enlargement of simple cysts in the left ovary measuring up to 2.4 cm. Continued yearly sonographic follow-up is advised.

## 2024-03-18 DIAGNOSIS — Z01.419 ENCOUNTER FOR GYNECOLOGICAL EXAMINATION (GENERAL) (ROUTINE) WITHOUT ABNORMAL FINDINGS: ICD-10-CM

## 2024-03-18 DIAGNOSIS — N83.202 UNSPECIFIED OVARIAN CYST, LEFT SIDE: ICD-10-CM

## 2024-03-18 DIAGNOSIS — N95.2 POSTMENOPAUSAL ATROPHIC VAGINITIS: ICD-10-CM

## 2024-03-18 LAB — HPV HIGH+LOW RISK DNA PNL CVX: SIGNIFICANT CHANGE UP

## 2024-03-19 LAB — CYTOLOGY SPEC DOC CYTO: SIGNIFICANT CHANGE UP

## 2024-03-24 ENCOUNTER — APPOINTMENT (OUTPATIENT)
Dept: ULTRASOUND IMAGING | Facility: IMAGING CENTER | Age: 54
End: 2024-03-24
Payer: COMMERCIAL

## 2024-03-24 ENCOUNTER — OUTPATIENT (OUTPATIENT)
Dept: OUTPATIENT SERVICES | Facility: HOSPITAL | Age: 54
LOS: 1 days | End: 2024-03-24
Payer: SELF-PAY

## 2024-03-24 DIAGNOSIS — N83.202 UNSPECIFIED OVARIAN CYST, LEFT SIDE: ICD-10-CM

## 2024-03-24 DIAGNOSIS — Z90.49 ACQUIRED ABSENCE OF OTHER SPECIFIED PARTS OF DIGESTIVE TRACT: Chronic | ICD-10-CM

## 2024-03-24 PROCEDURE — 76856 US EXAM PELVIC COMPLETE: CPT | Mod: 26

## 2024-03-24 PROCEDURE — 76856 US EXAM PELVIC COMPLETE: CPT

## 2024-03-24 PROCEDURE — 76830 TRANSVAGINAL US NON-OB: CPT

## 2024-03-24 PROCEDURE — 76830 TRANSVAGINAL US NON-OB: CPT | Mod: 26

## 2024-04-05 ENCOUNTER — RESULT REVIEW (OUTPATIENT)
Age: 54
End: 2024-04-05

## 2024-04-05 ENCOUNTER — OUTPATIENT (OUTPATIENT)
Dept: OUTPATIENT SERVICES | Facility: HOSPITAL | Age: 54
LOS: 1 days | End: 2024-04-05
Payer: SELF-PAY

## 2024-04-05 ENCOUNTER — APPOINTMENT (OUTPATIENT)
Dept: INTERNAL MEDICINE | Facility: CLINIC | Age: 54
End: 2024-04-05
Payer: COMMERCIAL

## 2024-04-05 VITALS
HEIGHT: 55 IN | WEIGHT: 118 LBS | HEART RATE: 68 BPM | DIASTOLIC BLOOD PRESSURE: 84 MMHG | SYSTOLIC BLOOD PRESSURE: 118 MMHG | OXYGEN SATURATION: 98 % | BODY MASS INDEX: 27.31 KG/M2

## 2024-04-05 DIAGNOSIS — I10 ESSENTIAL (PRIMARY) HYPERTENSION: ICD-10-CM

## 2024-04-05 DIAGNOSIS — M54.9 DORSALGIA, UNSPECIFIED: ICD-10-CM

## 2024-04-05 DIAGNOSIS — Z00.00 ENCOUNTER FOR GENERAL ADULT MEDICAL EXAMINATION W/OUT ABNORMAL FINDINGS: ICD-10-CM

## 2024-04-05 DIAGNOSIS — Z90.49 ACQUIRED ABSENCE OF OTHER SPECIFIED PARTS OF DIGESTIVE TRACT: Chronic | ICD-10-CM

## 2024-04-05 PROCEDURE — 83036 HEMOGLOBIN GLYCOSYLATED A1C: CPT

## 2024-04-05 PROCEDURE — 85025 COMPLETE CBC W/AUTO DIFF WBC: CPT

## 2024-04-05 PROCEDURE — 99213 OFFICE O/P EST LOW 20 MIN: CPT | Mod: GE,25

## 2024-04-05 PROCEDURE — 80053 COMPREHEN METABOLIC PANEL: CPT

## 2024-04-05 PROCEDURE — G0463: CPT | Mod: 25

## 2024-04-05 PROCEDURE — 90750 HZV VACC RECOMBINANT IM: CPT

## 2024-04-05 PROCEDURE — 90471 IMMUNIZATION ADMIN: CPT

## 2024-04-05 PROCEDURE — 80061 LIPID PANEL: CPT

## 2024-04-05 RX ORDER — MELOXICAM 7.5 MG/1
7.5 TABLET ORAL DAILY
Qty: 5 | Refills: 0 | Status: ACTIVE | COMMUNITY
Start: 2024-04-05 | End: 1900-01-01

## 2024-04-05 NOTE — HISTORY OF PRESENT ILLNESS
[FreeTextEntry1] : CPE [de-identified] : 52yo F w/ pmhx HTN, PINO, ovarian cyst presented to CPE.   #R hip  - 2 weeks history of R hip; pressure-like, in bilateral lower back, non-radiating, patient woke with the pain; at its worst pain was 10/10, aggravated by movement, it was constant  - Aleve improved pain; now pain free for 1 week - denied LE weakness, sensory changes, constipation or incontinence  # Abdominal pain - in January this year she started having fevers, and bone pain x1 week; then resolved but had discomfort in her abdomen that persistent - pain was severe and pressure like in her lower abdomen bilaterally. Denied nausea, vomiting, diarrhea - she had US pelvis and transvaginal which were normal - pt is postmenopausal - had her Pap performed last month - pain is now resolved - likely patient had viral illness that is now resolved  She lives with family, feels safe at home. Works cleaning houses. Denied toxic habits.

## 2024-04-05 NOTE — PLAN
[FreeTextEntry1] : #HCM  cbc cmp lipid, a1c Mammogram due in May; referral provided Shingles 2nd dose today Colonoscopy UTD  #Back pain - likely muscle soreness, strain - Meloxicam x 5 days  #Abdominal pain - now resolved; likely 2/2 viral infection might have caused mild colitis - advised to call back if she has recurrence of sxs - pt w/o concerning sxs like melena, hematochezia, hematemesis, change in stool caliber, night sweats, weight loss  discussed w/ Cacace.

## 2024-04-05 NOTE — HEALTH RISK ASSESSMENT
[Good] : ~his/her~  mood as  good [1 or 2 (0 pts)] : 1 or 2 (0 points) [Never (0 pts)] : Never (0 points) [No] : In the past 12 months have you used drugs other than those required for medical reasons? No [No falls in past year] : Patient reported no falls in the past year [0] : 2) Feeling down, depressed, or hopeless: Not at all (0) [Audit-CScore] : 0 [[id number]] : 0

## 2024-04-05 NOTE — INTERPRETER SERVICES
[Patient Declined  Services] : - None: Patient declined  services [FreeTextEntry3] : Patient declined professional  offered as physician is fluent in Maori. [TWNoteComboBox1] : Solomon Islander

## 2024-04-05 NOTE — PHYSICAL EXAM
[No Acute Distress] : no acute distress [Well Nourished] : well nourished [Well Developed] : well developed [Well-Appearing] : well-appearing [Normal Sclera/Conjunctiva] : normal sclera/conjunctiva [PERRL] : pupils equal round and reactive to light [EOMI] : extraocular movements intact [Normal Outer Ear/Nose] : the outer ears and nose were normal in appearance [Normal Oropharynx] : the oropharynx was normal [No JVD] : no jugular venous distention [No Lymphadenopathy] : no lymphadenopathy [Supple] : supple [Thyroid Normal, No Nodules] : the thyroid was normal and there were no nodules present [No Respiratory Distress] : no respiratory distress  [No Accessory Muscle Use] : no accessory muscle use [Clear to Auscultation] : lungs were clear to auscultation bilaterally [Normal Rate] : normal rate  [Regular Rhythm] : with a regular rhythm [Normal S1, S2] : normal S1 and S2 [No Murmur] : no murmur heard [No Carotid Bruits] : no carotid bruits [No Abdominal Bruit] : a ~M bruit was not heard ~T in the abdomen [No Varicosities] : no varicosities [Pedal Pulses Present] : the pedal pulses are present [No Edema] : there was no peripheral edema [No Palpable Aorta] : no palpable aorta [No Extremity Clubbing/Cyanosis] : no extremity clubbing/cyanosis [Soft] : abdomen soft [Non Tender] : non-tender [Non-distended] : non-distended [No Masses] : no abdominal mass palpated [No HSM] : no HSM [Normal Bowel Sounds] : normal bowel sounds [Normal Posterior Cervical Nodes] : no posterior cervical lymphadenopathy [Normal Anterior Cervical Nodes] : no anterior cervical lymphadenopathy [No CVA Tenderness] : no CVA  tenderness [No Spinal Tenderness] : no spinal tenderness [No Rash] : no rash [Coordination Grossly Intact] : coordination grossly intact [No Focal Deficits] : no focal deficits [Normal Gait] : normal gait [Deep Tendon Reflexes (DTR)] : deep tendon reflexes were 2+ and symmetric [Normal Affect] : the affect was normal [Normal Insight/Judgement] : insight and judgment were intact [de-identified] : FUll ROM in lower back/hip joint, mild tenderness in lowerback, no skin changes in the area

## 2024-04-12 LAB
ALBUMIN SERPL ELPH-MCNC: 4.2 G/DL
ALP BLD-CCNC: 160 U/L
ALT SERPL-CCNC: 50 U/L
ANION GAP SERPL CALC-SCNC: 10 MMOL/L
AST SERPL-CCNC: 34 U/L
BASOPHILS # BLD AUTO: 0.08 K/UL
BASOPHILS NFR BLD AUTO: 0.9 %
BILIRUB SERPL-MCNC: <0.2 MG/DL
BUN SERPL-MCNC: 12 MG/DL
CALCIUM SERPL-MCNC: 9.4 MG/DL
CHLORIDE SERPL-SCNC: 102 MMOL/L
CHOLEST SERPL-MCNC: 205 MG/DL
CO2 SERPL-SCNC: 27 MMOL/L
CREAT SERPL-MCNC: 0.97 MG/DL
EGFR: 70 ML/MIN/1.73M2
EOSINOPHIL # BLD AUTO: 0.43 K/UL
EOSINOPHIL NFR BLD AUTO: 5.1 %
ESTIMATED AVERAGE GLUCOSE: 120 MG/DL
GLUCOSE SERPL-MCNC: 107 MG/DL
HBA1C MFR BLD HPLC: 5.8 %
HCT VFR BLD CALC: 40.4 %
HDLC SERPL-MCNC: 53 MG/DL
HGB BLD-MCNC: 13.1 G/DL
IMM GRANULOCYTES NFR BLD AUTO: 0.5 %
LDLC SERPL CALC-MCNC: 117 MG/DL
LYMPHOCYTES # BLD AUTO: 3.21 K/UL
LYMPHOCYTES NFR BLD AUTO: 37.9 %
MAN DIFF?: NORMAL
MCHC RBC-ENTMCNC: 30.1 PG
MCHC RBC-ENTMCNC: 32.4 GM/DL
MCV RBC AUTO: 92.9 FL
MONOCYTES # BLD AUTO: 0.57 K/UL
MONOCYTES NFR BLD AUTO: 6.7 %
NEUTROPHILS # BLD AUTO: 4.14 K/UL
NEUTROPHILS NFR BLD AUTO: 48.9 %
NONHDLC SERPL-MCNC: 152 MG/DL
PLATELET # BLD AUTO: 404 K/UL
POTASSIUM SERPL-SCNC: 4.2 MMOL/L
PROT SERPL-MCNC: 6.9 G/DL
RBC # BLD: 4.35 M/UL
RBC # FLD: 13.2 %
SODIUM SERPL-SCNC: 140 MMOL/L
TRIGL SERPL-MCNC: 200 MG/DL
WBC # FLD AUTO: 8.47 K/UL

## 2024-04-16 DIAGNOSIS — Z23 ENCOUNTER FOR IMMUNIZATION: ICD-10-CM

## 2024-04-16 DIAGNOSIS — M54.9 DORSALGIA, UNSPECIFIED: ICD-10-CM

## 2024-04-29 ENCOUNTER — APPOINTMENT (OUTPATIENT)
Dept: INTERNAL MEDICINE | Facility: CLINIC | Age: 54
End: 2024-04-29

## 2024-06-01 ENCOUNTER — OUTPATIENT (OUTPATIENT)
Dept: OUTPATIENT SERVICES | Facility: HOSPITAL | Age: 54
LOS: 1 days | End: 2024-06-01
Payer: SELF-PAY

## 2024-06-01 ENCOUNTER — APPOINTMENT (OUTPATIENT)
Dept: MAMMOGRAPHY | Facility: IMAGING CENTER | Age: 54
End: 2024-06-01
Payer: SELF-PAY

## 2024-06-01 ENCOUNTER — RESULT REVIEW (OUTPATIENT)
Age: 54
End: 2024-06-01

## 2024-06-01 DIAGNOSIS — M54.9 DORSALGIA, UNSPECIFIED: ICD-10-CM

## 2024-06-01 DIAGNOSIS — Z00.00 ENCOUNTER FOR GENERAL ADULT MEDICAL EXAMINATION WITHOUT ABNORMAL FINDINGS: ICD-10-CM

## 2024-06-01 DIAGNOSIS — Z23 ENCOUNTER FOR IMMUNIZATION: ICD-10-CM

## 2024-06-01 DIAGNOSIS — Z90.49 ACQUIRED ABSENCE OF OTHER SPECIFIED PARTS OF DIGESTIVE TRACT: Chronic | ICD-10-CM

## 2024-06-01 PROCEDURE — 77063 BREAST TOMOSYNTHESIS BI: CPT

## 2024-06-01 PROCEDURE — 77067 SCR MAMMO BI INCL CAD: CPT | Mod: 26

## 2024-06-01 PROCEDURE — 77063 BREAST TOMOSYNTHESIS BI: CPT | Mod: 26

## 2024-06-01 PROCEDURE — 77067 SCR MAMMO BI INCL CAD: CPT
